# Patient Record
Sex: FEMALE | Race: WHITE | NOT HISPANIC OR LATINO | Employment: OTHER | URBAN - METROPOLITAN AREA
[De-identification: names, ages, dates, MRNs, and addresses within clinical notes are randomized per-mention and may not be internally consistent; named-entity substitution may affect disease eponyms.]

---

## 2018-08-29 ENCOUNTER — HOSPITAL ENCOUNTER (EMERGENCY)
Facility: HOSPITAL | Age: 78
Discharge: HOME | End: 2018-08-29
Admitting: PSYCHIATRY & NEUROLOGY

## 2018-08-29 ENCOUNTER — APPOINTMENT (OUTPATIENT)
Dept: GENERAL RADIOLOGY | Facility: HOSPITAL | Age: 78
End: 2018-08-29

## 2018-08-29 VITALS
BODY MASS INDEX: 13.83 KG/M2 | HEART RATE: 100 BPM | TEMPERATURE: 98.5 F | HEIGHT: 65 IN | WEIGHT: 83 LBS | DIASTOLIC BLOOD PRESSURE: 69 MMHG | SYSTOLIC BLOOD PRESSURE: 114 MMHG | OXYGEN SATURATION: 93 % | RESPIRATION RATE: 20 BRPM

## 2018-08-29 DIAGNOSIS — J44.1 COPD EXACERBATION (HCC): Primary | ICD-10-CM

## 2018-08-29 LAB
ALBUMIN SERPL-MCNC: 4.1 G/DL (ref 3.5–5.2)
ALBUMIN/GLOB SERPL: 1.3 G/DL
ALP SERPL-CCNC: 77 U/L (ref 39–117)
ALT SERPL W P-5'-P-CCNC: 32 U/L (ref 1–33)
ANION GAP SERPL CALCULATED.3IONS-SCNC: 11.4 MMOL/L
AST SERPL-CCNC: 34 U/L (ref 1–32)
BACTERIA UR QL AUTO: ABNORMAL /HPF
BASOPHILS # BLD AUTO: 0.03 10*3/MM3 (ref 0–0.2)
BASOPHILS NFR BLD AUTO: 0.4 % (ref 0–1.5)
BILIRUB SERPL-MCNC: 0.7 MG/DL (ref 0.1–1.2)
BILIRUB UR QL STRIP: NEGATIVE
BUN BLD-MCNC: 17 MG/DL (ref 8–23)
BUN/CREAT SERPL: 32.1 (ref 7–25)
CALCIUM SPEC-SCNC: 9.3 MG/DL (ref 8.6–10.5)
CHLORIDE SERPL-SCNC: 99 MMOL/L (ref 98–107)
CLARITY UR: ABNORMAL
CO2 SERPL-SCNC: 36.6 MMOL/L (ref 22–29)
COLOR UR: ABNORMAL
CREAT BLD-MCNC: 0.53 MG/DL (ref 0.57–1)
D-LACTATE SERPL-SCNC: 1.2 MMOL/L (ref 0.5–2)
DEPRECATED RDW RBC AUTO: 55.1 FL (ref 37–54)
EOSINOPHIL # BLD AUTO: 0.01 10*3/MM3 (ref 0–0.7)
EOSINOPHIL NFR BLD AUTO: 0.1 % (ref 0.3–6.2)
ERYTHROCYTE [DISTWIDTH] IN BLOOD BY AUTOMATED COUNT: 14.3 % (ref 11.7–13)
GFR SERPL CREATININE-BSD FRML MDRD: 112 ML/MIN/1.73
GLOBULIN UR ELPH-MCNC: 3.1 GM/DL
GLUCOSE BLD-MCNC: 113 MG/DL (ref 65–99)
GLUCOSE UR STRIP-MCNC: NEGATIVE MG/DL
HCT VFR BLD AUTO: 50 % (ref 35.6–45.5)
HGB BLD-MCNC: 15.6 G/DL (ref 11.9–15.5)
HGB UR QL STRIP.AUTO: ABNORMAL
HYALINE CASTS UR QL AUTO: ABNORMAL /LPF
IMM GRANULOCYTES # BLD: 0.01 10*3/MM3 (ref 0–0.03)
IMM GRANULOCYTES NFR BLD: 0.1 % (ref 0–0.5)
KETONES UR QL STRIP: ABNORMAL
LEUKOCYTE ESTERASE UR QL STRIP.AUTO: ABNORMAL
LYMPHOCYTES # BLD AUTO: 0.97 10*3/MM3 (ref 0.9–4.8)
LYMPHOCYTES NFR BLD AUTO: 13.3 % (ref 19.6–45.3)
MCH RBC QN AUTO: 32.6 PG (ref 26.9–32)
MCHC RBC AUTO-ENTMCNC: 31.2 G/DL (ref 32.4–36.3)
MCV RBC AUTO: 104.4 FL (ref 80.5–98.2)
MONOCYTES # BLD AUTO: 0.88 10*3/MM3 (ref 0.2–1.2)
MONOCYTES NFR BLD AUTO: 12 % (ref 5–12)
NEUTROPHILS # BLD AUTO: 5.42 10*3/MM3 (ref 1.9–8.1)
NEUTROPHILS NFR BLD AUTO: 74.2 % (ref 42.7–76)
NITRITE UR QL STRIP: NEGATIVE
NT-PROBNP SERPL-MCNC: 179.6 PG/ML (ref 0–1800)
PH UR STRIP.AUTO: 6.5 [PH] (ref 5–8)
PLATELET # BLD AUTO: 131 10*3/MM3 (ref 140–500)
PMV BLD AUTO: 12.8 FL (ref 6–12)
POTASSIUM BLD-SCNC: 4.5 MMOL/L (ref 3.5–5.2)
PROT SERPL-MCNC: 7.2 G/DL (ref 6–8.5)
PROT UR QL STRIP: NEGATIVE
RBC # BLD AUTO: 4.79 10*6/MM3 (ref 3.9–5.2)
RBC # UR: ABNORMAL /HPF
REF LAB TEST METHOD: ABNORMAL
SODIUM BLD-SCNC: 147 MMOL/L (ref 136–145)
SP GR UR STRIP: 1.02 (ref 1–1.03)
SQUAMOUS #/AREA URNS HPF: ABNORMAL /HPF
TROPONIN T SERPL-MCNC: <0.01 NG/ML (ref 0–0.03)
UROBILINOGEN UR QL STRIP: ABNORMAL
WBC NRBC COR # BLD: 7.31 10*3/MM3 (ref 4.5–10.7)
WBC UR QL AUTO: ABNORMAL /HPF

## 2018-08-29 PROCEDURE — 25010000002 METHYLPREDNISOLONE PER 125 MG: Performed by: NURSE PRACTITIONER

## 2018-08-29 PROCEDURE — 85025 COMPLETE CBC W/AUTO DIFF WBC: CPT | Performed by: NURSE PRACTITIONER

## 2018-08-29 PROCEDURE — 94799 UNLISTED PULMONARY SVC/PX: CPT

## 2018-08-29 PROCEDURE — 87040 BLOOD CULTURE FOR BACTERIA: CPT | Performed by: NURSE PRACTITIONER

## 2018-08-29 PROCEDURE — 96374 THER/PROPH/DIAG INJ IV PUSH: CPT

## 2018-08-29 PROCEDURE — 81001 URINALYSIS AUTO W/SCOPE: CPT | Performed by: NURSE PRACTITIONER

## 2018-08-29 PROCEDURE — 93010 ELECTROCARDIOGRAM REPORT: CPT | Performed by: INTERNAL MEDICINE

## 2018-08-29 PROCEDURE — 71046 X-RAY EXAM CHEST 2 VIEWS: CPT

## 2018-08-29 PROCEDURE — 93005 ELECTROCARDIOGRAM TRACING: CPT | Performed by: NURSE PRACTITIONER

## 2018-08-29 PROCEDURE — 80053 COMPREHEN METABOLIC PANEL: CPT | Performed by: NURSE PRACTITIONER

## 2018-08-29 PROCEDURE — 83605 ASSAY OF LACTIC ACID: CPT | Performed by: NURSE PRACTITIONER

## 2018-08-29 PROCEDURE — 99284 EMERGENCY DEPT VISIT MOD MDM: CPT

## 2018-08-29 PROCEDURE — 83880 ASSAY OF NATRIURETIC PEPTIDE: CPT | Performed by: NURSE PRACTITIONER

## 2018-08-29 PROCEDURE — 94640 AIRWAY INHALATION TREATMENT: CPT

## 2018-08-29 PROCEDURE — 84484 ASSAY OF TROPONIN QUANT: CPT | Performed by: NURSE PRACTITIONER

## 2018-08-29 RX ORDER — IPRATROPIUM BROMIDE AND ALBUTEROL SULFATE 2.5; .5 MG/3ML; MG/3ML
3 SOLUTION RESPIRATORY (INHALATION) ONCE
Status: COMPLETED | OUTPATIENT
Start: 2018-08-29 | End: 2018-08-29

## 2018-08-29 RX ORDER — ALBUTEROL SULFATE 2.5 MG/3ML
2.5 SOLUTION RESPIRATORY (INHALATION)
Status: COMPLETED | OUTPATIENT
Start: 2018-08-29 | End: 2018-08-29

## 2018-08-29 RX ORDER — LEVOFLOXACIN 500 MG/1
500 TABLET, FILM COATED ORAL DAILY
Qty: 7 TABLET | Refills: 0 | Status: SHIPPED | OUTPATIENT
Start: 2018-08-29 | End: 2018-09-03

## 2018-08-29 RX ORDER — METHYLPREDNISOLONE 4 MG/1
TABLET ORAL
Qty: 21 TABLET | Refills: 0 | Status: SHIPPED | OUTPATIENT
Start: 2018-08-29 | End: 2018-09-03

## 2018-08-29 RX ORDER — METHYLPREDNISOLONE SODIUM SUCCINATE 125 MG/2ML
125 INJECTION, POWDER, LYOPHILIZED, FOR SOLUTION INTRAMUSCULAR; INTRAVENOUS ONCE
Status: COMPLETED | OUTPATIENT
Start: 2018-08-29 | End: 2018-08-29

## 2018-08-29 RX ADMIN — IPRATROPIUM BROMIDE AND ALBUTEROL SULFATE 3 ML: .5; 3 SOLUTION RESPIRATORY (INHALATION) at 09:34

## 2018-08-29 RX ADMIN — METHYLPREDNISOLONE SODIUM SUCCINATE 125 MG: 125 INJECTION, POWDER, FOR SOLUTION INTRAMUSCULAR; INTRAVENOUS at 10:22

## 2018-08-29 RX ADMIN — ALBUTEROL SULFATE 2.5 MG: 2.5 SOLUTION RESPIRATORY (INHALATION) at 11:06

## 2018-08-29 RX ADMIN — ALBUTEROL SULFATE 2.5 MG: 2.5 SOLUTION RESPIRATORY (INHALATION) at 09:49

## 2018-09-03 ENCOUNTER — HOSPITAL ENCOUNTER (INPATIENT)
Facility: HOSPITAL | Age: 78
LOS: 3 days | Discharge: HOME HEALTH | End: 2018-09-06
Attending: EMERGENCY MEDICINE | Admitting: HOSPITALIST

## 2018-09-03 ENCOUNTER — APPOINTMENT (OUTPATIENT)
Dept: GENERAL RADIOLOGY | Facility: HOSPITAL | Age: 78
End: 2018-09-03

## 2018-09-03 DIAGNOSIS — Z74.09 IMPAIRED FUNCTIONAL MOBILITY, BALANCE, GAIT, AND ENDURANCE: ICD-10-CM

## 2018-09-03 DIAGNOSIS — J44.1 COPD EXACERBATION (HCC): Primary | ICD-10-CM

## 2018-09-03 PROBLEM — J96.22 ACUTE ON CHRONIC RESPIRATORY FAILURE WITH HYPERCAPNIA (HCC): Status: ACTIVE | Noted: 2018-09-03

## 2018-09-03 PROBLEM — J43.9 EMPHYSEMA OF LUNG (HCC): Status: ACTIVE | Noted: 2018-09-03

## 2018-09-03 PROBLEM — E86.0 DEHYDRATION: Status: ACTIVE | Noted: 2018-09-03

## 2018-09-03 PROBLEM — E46 PROTEIN-CALORIE MALNUTRITION (HCC): Status: ACTIVE | Noted: 2018-09-03

## 2018-09-03 LAB
ALBUMIN SERPL-MCNC: 3.6 G/DL (ref 3.5–5.2)
ALBUMIN/GLOB SERPL: 1.3 G/DL
ALP SERPL-CCNC: 61 U/L (ref 39–117)
ALT SERPL W P-5'-P-CCNC: 24 U/L (ref 1–33)
ANION GAP SERPL CALCULATED.3IONS-SCNC: 14.7 MMOL/L
ARTERIAL PATENCY WRIST A: NORMAL
ARTERIAL PATENCY WRIST A: POSITIVE
AST SERPL-CCNC: 34 U/L (ref 1–32)
ATMOSPHERIC PRESS: 754.5 MMHG
ATMOSPHERIC PRESS: NORMAL MMHG
BACTERIA SPEC AEROBE CULT: NORMAL
BACTERIA SPEC AEROBE CULT: NORMAL
BACTERIA UR QL AUTO: ABNORMAL /HPF
BASE EXCESS BLDA CALC-SCNC: 7.8 MMOL/L (ref 0–2)
BASE EXCESS BLDA CALC-SCNC: NORMAL MMOL/L (ref 0–2)
BASOPHILS # BLD AUTO: 0.02 10*3/MM3 (ref 0–0.2)
BASOPHILS NFR BLD AUTO: 0.2 % (ref 0–1.5)
BDY SITE: ABNORMAL
BDY SITE: NORMAL
BILIRUB SERPL-MCNC: 0.6 MG/DL (ref 0.1–1.2)
BILIRUB UR QL STRIP: NEGATIVE
BUN BLD-MCNC: 16 MG/DL (ref 8–23)
BUN/CREAT SERPL: 26.2 (ref 7–25)
CALCIUM SPEC-SCNC: 8.6 MG/DL (ref 8.6–10.5)
CHLORIDE SERPL-SCNC: 99 MMOL/L (ref 98–107)
CLARITY UR: CLEAR
CO2 SERPL-SCNC: 27.3 MMOL/L (ref 22–29)
COLOR UR: YELLOW
CREAT BLD-MCNC: 0.61 MG/DL (ref 0.57–1)
DEPRECATED RDW RBC AUTO: 53.8 FL (ref 37–54)
EOSINOPHIL # BLD AUTO: 0.02 10*3/MM3 (ref 0–0.7)
EOSINOPHIL NFR BLD AUTO: 0.2 % (ref 0.3–6.2)
ERYTHROCYTE [DISTWIDTH] IN BLOOD BY AUTOMATED COUNT: 13.9 % (ref 11.7–13)
GAS FLOW AIRWAY: 2 LPM
GAS FLOW AIRWAY: NORMAL LPM
GFR SERPL CREATININE-BSD FRML MDRD: 95 ML/MIN/1.73
GLOBULIN UR ELPH-MCNC: 2.7 GM/DL
GLUCOSE BLD-MCNC: 139 MG/DL (ref 65–99)
GLUCOSE BLDC GLUCOMTR-MCNC: 134 MG/DL (ref 70–130)
GLUCOSE UR STRIP-MCNC: NEGATIVE MG/DL
HBA1C MFR BLD: 6.2 % (ref 4.8–5.6)
HCO3 BLDA-SCNC: 36.3 MMOL/L (ref 22–28)
HCO3 BLDA-SCNC: NORMAL MMOL/L (ref 22–28)
HCT VFR BLD AUTO: 54 % (ref 35.6–45.5)
HGB BLD-MCNC: 16.3 G/DL (ref 11.9–15.5)
HGB UR QL STRIP.AUTO: ABNORMAL
HOLD SPECIMEN: NORMAL
HYALINE CASTS UR QL AUTO: ABNORMAL /LPF
IMM GRANULOCYTES # BLD: 0.02 10*3/MM3 (ref 0–0.03)
IMM GRANULOCYTES NFR BLD: 0.2 % (ref 0–0.5)
KETONES UR QL STRIP: NEGATIVE
LEUKOCYTE ESTERASE UR QL STRIP.AUTO: ABNORMAL
LYMPHOCYTES # BLD AUTO: 1.23 10*3/MM3 (ref 0.9–4.8)
LYMPHOCYTES NFR BLD AUTO: 14.3 % (ref 19.6–45.3)
MAGNESIUM SERPL-MCNC: 2.3 MG/DL (ref 1.6–2.4)
MCH RBC QN AUTO: 31.9 PG (ref 26.9–32)
MCHC RBC AUTO-ENTMCNC: 30.2 G/DL (ref 32.4–36.3)
MCV RBC AUTO: 105.7 FL (ref 80.5–98.2)
MODALITY: ABNORMAL
MODALITY: NORMAL
MONOCYTES # BLD AUTO: 0.79 10*3/MM3 (ref 0.2–1.2)
MONOCYTES NFR BLD AUTO: 9.2 % (ref 5–12)
NEUTROPHILS # BLD AUTO: 6.57 10*3/MM3 (ref 1.9–8.1)
NEUTROPHILS NFR BLD AUTO: 76.1 % (ref 42.7–76)
NITRITE UR QL STRIP: NEGATIVE
NRBC BLD MANUAL-RTO: 0 /100 WBC (ref 0–0)
NT-PROBNP SERPL-MCNC: 245 PG/ML (ref 0–1800)
PCO2 BLDA: 64.2 MM HG (ref 35–45)
PCO2 BLDA: NORMAL MM HG (ref 35–45)
PH BLDA: 7.36 PH UNITS (ref 7.35–7.45)
PH BLDA: NORMAL PH UNITS (ref 7.35–7.45)
PH UR STRIP.AUTO: 7 [PH] (ref 5–8)
PLAT MORPH BLD: NORMAL
PLATELET # BLD AUTO: 119 10*3/MM3 (ref 140–500)
PMV BLD AUTO: 12 FL (ref 6–12)
PO2 BLDA: 52.9 MM HG (ref 80–100)
PO2 BLDA: NORMAL MM HG (ref 80–100)
POTASSIUM BLD-SCNC: 5 MMOL/L (ref 3.5–5.2)
PROT SERPL-MCNC: 6.3 G/DL (ref 6–8.5)
PROT UR QL STRIP: NEGATIVE
RBC # BLD AUTO: 5.11 10*6/MM3 (ref 3.9–5.2)
RBC # UR: ABNORMAL /HPF
RBC MORPH BLD: NORMAL
REF LAB TEST METHOD: ABNORMAL
SAO2 % BLDCOA: 84.2 % (ref 92–99)
SAO2 % BLDCOA: NORMAL % (ref 92–99)
SET MECH RESP RATE: 24
SET MECH RESP RATE: NORMAL
SODIUM BLD-SCNC: 141 MMOL/L (ref 136–145)
SP GR UR STRIP: 1.02 (ref 1–1.03)
SQUAMOUS #/AREA URNS HPF: ABNORMAL /HPF
TOTAL RATE: NORMAL BREATHS/MINUTE
TROPONIN T SERPL-MCNC: <0.01 NG/ML (ref 0–0.03)
UROBILINOGEN UR QL STRIP: ABNORMAL
WBC MORPH BLD: NORMAL
WBC NRBC COR # BLD: 8.63 10*3/MM3 (ref 4.5–10.7)
WBC UR QL AUTO: ABNORMAL /HPF
WHOLE BLOOD HOLD SPECIMEN: NORMAL

## 2018-09-03 PROCEDURE — 36600 WITHDRAWAL OF ARTERIAL BLOOD: CPT

## 2018-09-03 PROCEDURE — 99285 EMERGENCY DEPT VISIT HI MDM: CPT

## 2018-09-03 PROCEDURE — 94799 UNLISTED PULMONARY SVC/PX: CPT

## 2018-09-03 PROCEDURE — 36415 COLL VENOUS BLD VENIPUNCTURE: CPT

## 2018-09-03 PROCEDURE — 25010000002 METHYLPREDNISOLONE PER 125 MG: Performed by: EMERGENCY MEDICINE

## 2018-09-03 PROCEDURE — 87581 M.PNEUMON DNA AMP PROBE: CPT | Performed by: HOSPITALIST

## 2018-09-03 PROCEDURE — 71046 X-RAY EXAM CHEST 2 VIEWS: CPT

## 2018-09-03 PROCEDURE — 83880 ASSAY OF NATRIURETIC PEPTIDE: CPT | Performed by: EMERGENCY MEDICINE

## 2018-09-03 PROCEDURE — 81001 URINALYSIS AUTO W/SCOPE: CPT | Performed by: EMERGENCY MEDICINE

## 2018-09-03 PROCEDURE — 85025 COMPLETE CBC W/AUTO DIFF WBC: CPT | Performed by: EMERGENCY MEDICINE

## 2018-09-03 PROCEDURE — 84484 ASSAY OF TROPONIN QUANT: CPT | Performed by: EMERGENCY MEDICINE

## 2018-09-03 PROCEDURE — 93005 ELECTROCARDIOGRAM TRACING: CPT | Performed by: EMERGENCY MEDICINE

## 2018-09-03 PROCEDURE — 87486 CHLMYD PNEUM DNA AMP PROBE: CPT | Performed by: HOSPITALIST

## 2018-09-03 PROCEDURE — 83036 HEMOGLOBIN GLYCOSYLATED A1C: CPT | Performed by: HOSPITALIST

## 2018-09-03 PROCEDURE — 94640 AIRWAY INHALATION TREATMENT: CPT

## 2018-09-03 PROCEDURE — 85007 BL SMEAR W/DIFF WBC COUNT: CPT | Performed by: EMERGENCY MEDICINE

## 2018-09-03 PROCEDURE — 25010000002 ENOXAPARIN PER 10 MG: Performed by: HOSPITALIST

## 2018-09-03 PROCEDURE — 82803 BLOOD GASES ANY COMBINATION: CPT

## 2018-09-03 PROCEDURE — 80053 COMPREHEN METABOLIC PANEL: CPT | Performed by: EMERGENCY MEDICINE

## 2018-09-03 PROCEDURE — 93010 ELECTROCARDIOGRAM REPORT: CPT | Performed by: INTERNAL MEDICINE

## 2018-09-03 PROCEDURE — 87633 RESP VIRUS 12-25 TARGETS: CPT | Performed by: HOSPITALIST

## 2018-09-03 PROCEDURE — 25010000002 METHYLPREDNISOLONE PER 40 MG: Performed by: HOSPITALIST

## 2018-09-03 PROCEDURE — 87798 DETECT AGENT NOS DNA AMP: CPT | Performed by: HOSPITALIST

## 2018-09-03 PROCEDURE — 82962 GLUCOSE BLOOD TEST: CPT

## 2018-09-03 PROCEDURE — 83735 ASSAY OF MAGNESIUM: CPT | Performed by: EMERGENCY MEDICINE

## 2018-09-03 RX ORDER — ONDANSETRON 2 MG/ML
4 INJECTION INTRAMUSCULAR; INTRAVENOUS EVERY 6 HOURS PRN
Status: DISCONTINUED | OUTPATIENT
Start: 2018-09-03 | End: 2018-09-06 | Stop reason: HOSPADM

## 2018-09-03 RX ORDER — TRAMADOL HYDROCHLORIDE 50 MG/1
50 TABLET ORAL EVERY 6 HOURS PRN
COMMUNITY

## 2018-09-03 RX ORDER — METHYLPREDNISOLONE SODIUM SUCCINATE 125 MG/2ML
125 INJECTION, POWDER, LYOPHILIZED, FOR SOLUTION INTRAMUSCULAR; INTRAVENOUS ONCE
Status: COMPLETED | OUTPATIENT
Start: 2018-09-03 | End: 2018-09-03

## 2018-09-03 RX ORDER — SODIUM CHLORIDE 9 MG/ML
75 INJECTION, SOLUTION INTRAVENOUS CONTINUOUS
Status: DISCONTINUED | OUTPATIENT
Start: 2018-09-03 | End: 2018-09-04

## 2018-09-03 RX ORDER — ALBUTEROL SULFATE 2.5 MG/3ML
2.5 SOLUTION RESPIRATORY (INHALATION)
Status: COMPLETED | OUTPATIENT
Start: 2018-09-03 | End: 2018-09-03

## 2018-09-03 RX ORDER — MULTIPLE VITAMINS W/ MINERALS TAB 9MG-400MCG
1 TAB ORAL DAILY
Status: DISCONTINUED | OUTPATIENT
Start: 2018-09-03 | End: 2018-09-03

## 2018-09-03 RX ORDER — IPRATROPIUM BROMIDE AND ALBUTEROL SULFATE 2.5; .5 MG/3ML; MG/3ML
3 SOLUTION RESPIRATORY (INHALATION) ONCE
Status: COMPLETED | OUTPATIENT
Start: 2018-09-03 | End: 2018-09-03

## 2018-09-03 RX ORDER — SODIUM CHLORIDE 0.9 % (FLUSH) 0.9 %
1-10 SYRINGE (ML) INJECTION AS NEEDED
Status: DISCONTINUED | OUTPATIENT
Start: 2018-09-03 | End: 2018-09-06 | Stop reason: HOSPADM

## 2018-09-03 RX ORDER — GUAIFENESIN 600 MG/1
600 TABLET, EXTENDED RELEASE ORAL EVERY 12 HOURS
Status: DISCONTINUED | OUTPATIENT
Start: 2018-09-03 | End: 2018-09-06 | Stop reason: HOSPADM

## 2018-09-03 RX ORDER — METHYLPREDNISOLONE SODIUM SUCCINATE 40 MG/ML
30 INJECTION, POWDER, LYOPHILIZED, FOR SOLUTION INTRAMUSCULAR; INTRAVENOUS EVERY 12 HOURS
Status: DISCONTINUED | OUTPATIENT
Start: 2018-09-03 | End: 2018-09-05

## 2018-09-03 RX ORDER — ACETAMINOPHEN 325 MG/1
650 TABLET ORAL EVERY 4 HOURS PRN
Status: DISCONTINUED | OUTPATIENT
Start: 2018-09-03 | End: 2018-09-06 | Stop reason: HOSPADM

## 2018-09-03 RX ORDER — TRAMADOL HYDROCHLORIDE 50 MG/1
50 TABLET ORAL EVERY 6 HOURS PRN
Status: DISCONTINUED | OUTPATIENT
Start: 2018-09-03 | End: 2018-09-06 | Stop reason: HOSPADM

## 2018-09-03 RX ORDER — ACETAMINOPHEN 500 MG
1000 TABLET ORAL ONCE
Status: COMPLETED | OUTPATIENT
Start: 2018-09-03 | End: 2018-09-03

## 2018-09-03 RX ORDER — IPRATROPIUM BROMIDE AND ALBUTEROL SULFATE 2.5; .5 MG/3ML; MG/3ML
3 SOLUTION RESPIRATORY (INHALATION)
Status: DISCONTINUED | OUTPATIENT
Start: 2018-09-03 | End: 2018-09-06 | Stop reason: HOSPADM

## 2018-09-03 RX ORDER — SODIUM CHLORIDE 0.9 % (FLUSH) 0.9 %
10 SYRINGE (ML) INJECTION AS NEEDED
Status: DISCONTINUED | OUTPATIENT
Start: 2018-09-03 | End: 2018-09-06 | Stop reason: HOSPADM

## 2018-09-03 RX ADMIN — SODIUM CHLORIDE 500 ML: 9 INJECTION, SOLUTION INTRAVENOUS at 14:56

## 2018-09-03 RX ADMIN — ALBUTEROL SULFATE 2.5 MG: 2.5 SOLUTION RESPIRATORY (INHALATION) at 14:37

## 2018-09-03 RX ADMIN — SODIUM CHLORIDE 250 ML: 9 INJECTION, SOLUTION INTRAVENOUS at 13:53

## 2018-09-03 RX ADMIN — IPRATROPIUM BROMIDE AND ALBUTEROL SULFATE 3 ML: .5; 3 SOLUTION RESPIRATORY (INHALATION) at 20:20

## 2018-09-03 RX ADMIN — IPRATROPIUM BROMIDE AND ALBUTEROL SULFATE 3 ML: .5; 3 SOLUTION RESPIRATORY (INHALATION) at 13:44

## 2018-09-03 RX ADMIN — GUAIFENESIN 600 MG: 600 TABLET, EXTENDED RELEASE ORAL at 20:44

## 2018-09-03 RX ADMIN — METHYLPREDNISOLONE SODIUM SUCCINATE 125 MG: 125 INJECTION, POWDER, FOR SOLUTION INTRAMUSCULAR; INTRAVENOUS at 13:53

## 2018-09-03 RX ADMIN — METHYLPREDNISOLONE SODIUM SUCCINATE 30 MG: 40 INJECTION, POWDER, LYOPHILIZED, FOR SOLUTION INTRAMUSCULAR; INTRAVENOUS at 18:48

## 2018-09-03 RX ADMIN — ACETAMINOPHEN 1000 MG: 500 TABLET, FILM COATED ORAL at 13:56

## 2018-09-03 RX ADMIN — ALBUTEROL SULFATE 2.5 MG: 2.5 SOLUTION RESPIRATORY (INHALATION) at 14:15

## 2018-09-03 RX ADMIN — ENOXAPARIN SODIUM 40 MG: 40 INJECTION SUBCUTANEOUS at 18:48

## 2018-09-03 RX ADMIN — TRAMADOL HYDROCHLORIDE 50 MG: 50 TABLET, FILM COATED ORAL at 20:44

## 2018-09-03 RX ADMIN — SODIUM CHLORIDE 75 ML/HR: 9 INJECTION, SOLUTION INTRAVENOUS at 18:52

## 2018-09-03 NOTE — PLAN OF CARE
Problem: Breathing Pattern Ineffective (Adult)  Intervention: Optimize Oxygenation/Ventilation/Perfusion   09/03/18 1824   Respiratory Interventions   Airway/Ventilation Management calming measures promoted   Breathing Techniques/Airway Clearance deep/controlled cough encouraged   Positioning   Head of Bed (HOB) HOB at 90 degrees       Goal: Effective Oxygenation/Ventilation  Outcome: Ongoing (interventions implemented as appropriate)   09/03/18 1824   Breathing Pattern Ineffective (Adult)   Effective Oxygenation/Ventilation making progress toward outcome

## 2018-09-03 NOTE — PLAN OF CARE
Problem: Fall Risk (Adult)  Intervention: Monitor/Assist with Self Care   09/03/18 4142   Activity   Activity Assistance Provided assistance, 1 person   Daily Care Interventions   Self-Care Promotion BADL personal objects within reach

## 2018-09-03 NOTE — PLAN OF CARE
Problem: Patient Care Overview  Goal: Plan of Care Review  Outcome: Ongoing (interventions implemented as appropriate)   09/03/18 0223   Coping/Psychosocial   Plan of Care Reviewed With patient   Plan of Care Review   Progress no change   OTHER   Outcome Summary Alert and oriented x4, no falls, 3L per n/c, no SOA at this time. Assist x2 to BSC. will CTM

## 2018-09-03 NOTE — ED PROVIDER NOTES
EMERGENCY DEPARTMENT ENCOUNTER    CHIEF COMPLAINT  Chief Complaint: Generalized weakness  History given by: Pt  History limited by: Nothing  Room Number: 21/21  PMD: Dennise Chu MD      HPI:  Pt is a 77 y.o. female who presents complaining of not feeling improved after being in the hospital 5 days ago with bronchitis. Since being discharged, the pt has increased weakness (family states the pt cannot stand even with assist of walker and usually ambulates independently with a walker) and productive cough. The pt denies current fever, abd pain, dysuria, black or bloody stool, or leg swelling, and confirms chest pain with cough. The pt normally walks with a walker but has not been able to due to weakness.   The pt is on 2-3L of O2 at home. The pt finished her medrol dose pack and levaquin yesterday.     Duration:  5 days  Onset: Gradual  Timing: Constant  Location: General  Quality: Weak  Intensity/Severity: Moderate  Progression: Worsening  Associated Symptoms: Productive cough, chest pain with cough  Aggravating Factors: Unknown  Alleviating Factors: None  Previous Episodes: Unknown  Treatment before arrival: The pt finished medrol dose pack yesterday and does not feel improved.     PAST MEDICAL HISTORY  Active Ambulatory Problems     Diagnosis Date Noted   • Acute respiratory failure with hypoxia (CMS/[facility]) 12/01/2016   • Chronic respiratory failure with hypercapnia (CMS/[facility]) 12/01/2016   • Hyperglycemia 12/01/2016   • Chronic back pain 12/01/2016   • Malnutrition of moderate degree ([place]: 60% to less than 75% of standard weight) (CMS/[facility]) 12/03/2016     Resolved Ambulatory Problems     Diagnosis Date Noted   • Chronic obstructive pulmonary disease with acute exacerbation (CMS/[facility]) 12/01/2016     Past Medical History:   Diagnosis Date   • Back pain    • COPD (chronic obstructive pulmonary disease) (CMS/[facility])    • Emphysema of lung (CMS/[facility])    • Pneumonia        PAST SURGICAL HISTORY  Past Surgical History:    Procedure Laterality Date   • ABDOMINAL SURGERY     • HYSTERECTOMY         FAMILY HISTORY  History reviewed. No pertinent family history.    SOCIAL HISTORY  Social History     Social History   • Marital status:      Spouse name: N/A   • Number of children: N/A   • Years of education: N/A     Occupational History   • Not on file.     Social History Main Topics   • Smoking status: Current Every Day Smoker     Packs/day: 1.00     Types: Cigarettes   • Smokeless tobacco: Not on file   • Alcohol use No   • Drug use: No   • Sexual activity: Defer     Other Topics Concern   • Not on file     Social History Narrative   • No narrative on file       ALLERGIES  Erythromycin; Aspirin; Codeine; Iodine; Latex; Morphine; Penicillins; and Sulfa antibiotics    REVIEW OF SYSTEMS  Review of Systems   Constitutional: Negative for chills and fever.   HENT: Negative for rhinorrhea and sore throat.    Eyes: Negative for visual disturbance.   Respiratory: Positive for cough. Negative for shortness of breath.    Cardiovascular: Positive for chest pain (With cough). Negative for palpitations and leg swelling.   Gastrointestinal: Negative for abdominal pain, diarrhea and vomiting.   Endocrine: Negative.    Genitourinary: Negative for decreased urine volume, dysuria and frequency.   Musculoskeletal: Negative for neck pain.   Skin: Negative for rash.   Neurological: Positive for weakness. Negative for syncope and headaches.   Psychiatric/Behavioral: Negative.    All other systems reviewed and are negative.      PHYSICAL EXAM  ED Triage Vitals   Temp Heart Rate Resp BP SpO2   09/03/18 1135 09/03/18 1135 09/03/18 1135 09/03/18 1144 09/03/18 1135   98.5 °F (36.9 °C) 90 20 129/78 93 %      Temp src Heart Rate Source Patient Position BP Location FiO2 (%)   -- -- 09/03/18 1144 09/03/18 1144 --     Sitting Right arm        Physical Exam   Constitutional: She is oriented to person, place, and time.  Non-toxic appearance. She appears distressed  (mild).   HENT:   Head: Normocephalic and atraumatic.   Eyes: EOM are normal.   Neck: Normal range of motion. Neck supple.   Cardiovascular: Normal rate, regular rhythm, normal heart sounds and intact distal pulses.    No murmur heard.  Pulses:       Posterior tibial pulses are 2+ on the right side, and 2+ on the left side.   Pulmonary/Chest: Effort normal. No respiratory distress. She has decreased breath sounds. She has wheezes.   Abdominal: Soft. Bowel sounds are normal. There is no tenderness. There is no rebound and no guarding.   Musculoskeletal: Normal range of motion. She exhibits no edema.   Neurological: She is alert and oriented to person, place, and time.   Skin: Skin is warm and dry.   Psychiatric: Affect normal.   Nursing note and vitals reviewed.      LAB RESULTS  Lab Results (last 24 hours)     Procedure Component Value Units Date/Time    CBC & Differential [[id number]] Collected:  09/03/18 1149    Specimen:  Blood Updated:  09/03/18 1226    Narrative:       The following orders were created for panel order CBC & Differential.  Procedure                               Abnormality         Status                     ---------                               -----------         ------                     Scan Slide[[id number]]                   Normal              Final result               CBC Auto Differential[[id number]]        Abnormal            Final result                 Please view results for these tests on the individual orders.    Comprehensive Metabolic Panel [[id number]]  (Abnormal) Collected:  09/03/18 1149    Specimen:  Blood Updated:  09/03/18 1256     Glucose 139 (H) mg/dL      BUN 16 mg/dL      Creatinine 0.61 mg/dL      Sodium 141 mmol/L      Potassium 5.0 mmol/L      Chloride 99 mmol/L      CO2 27.3 mmol/L      Calcium 8.6 mg/dL      Total Protein 6.3 g/dL      Albumin 3.60 g/dL      ALT (SGPT) 24 U/L      AST (SGOT) 34 (H) U/L      Comment: Specimen hemolyzed.  Results may be affected.         Alkaline Phosphatase 61 U/L      Total Bilirubin 0.6 mg/dL      eGFR Non African Amer 95 mL/min/1.73      Globulin 2.7 gm/dL      A/G Ratio 1.3 g/dL      BUN/Creatinine Ratio 26.2 (H)     Anion Gap 14.7 mmol/L     Narrative:       The MDRD GFR formula is only valid for adults with stable renal function between ages 18 and 70.    Troponin [[id number]]  (Normal) Collected:  09/03/18 1149    Specimen:  Blood Updated:  09/03/18 1239     Troponin T <0.010 ng/mL     Narrative:       Troponin T Reference Ranges:  Less than 0.03 ng/mL:    Negative for AMI  0.03 to 0.09 ng/mL:      Indeterminant for AMI  Greater than 0.09 ng/mL: Positive for AMI    Magnesium [[id number]]  (Normal) Collected:  09/03/18 1149    Specimen:  Blood Updated:  09/03/18 1239     Magnesium 2.3 mg/dL     CBC Auto Differential [[id number]]  (Abnormal) Collected:  09/03/18 1149    Specimen:  Blood Updated:  09/03/18 1226     WBC 8.63 10*3/mm3      RBC 5.11 10*6/mm3      Hemoglobin 16.3 (H) g/dL      Hematocrit 54.0 (H) %      .7 (H) fL      MCH 31.9 pg      MCHC 30.2 (L) g/dL      RDW 13.9 (H) %      RDW-SD 53.8 fl      MPV 12.0 fL      Platelets 119 (L) 10*3/mm3      Neutrophil % 76.1 (H) %      Lymphocyte % 14.3 (L) %      Monocyte % 9.2 %      Eosinophil % 0.2 (L) %      Basophil % 0.2 %      Immature Grans % 0.2 %      Neutrophils, Absolute 6.57 10*3/mm3      Lymphocytes, Absolute 1.23 10*3/mm3      Monocytes, Absolute 0.79 10*3/mm3      Eosinophils, Absolute 0.02 10*3/mm3      Basophils, Absolute 0.02 10*3/mm3      Immature Grans, Absolute 0.02 10*3/mm3      nRBC 0.0 /100 WBC     Scan Slide [[id number]]  (Normal) Collected:  09/03/18 1149    Specimen:  Blood Updated:  09/03/18 1226     RBC Morphology Normal     WBC Morphology Normal     Platelet Morphology Normal    POC Glucose Once [[id number]]  (Abnormal) Collected:  09/03/18 1151    Specimen:  Blood Updated:  09/03/18 1153     Glucose 134 (H) mg/dL     Narrative:       Meter: [id number]  [facility] [id number] [facility]    Urinalysis With Microscopic If Indicated (No Culture) - Urine, Catheter [[id number]]  (Abnormal) Collected:  09/03/18 1200    Specimen:  Urine from Urine, Catheter Updated:  09/03/18 1224     Color, UA Yellow     Appearance, UA Clear     pH, UA 7.0     Specific Gravity, UA 1.018     Glucose, UA Negative     Ketones, UA Negative     Bilirubin, UA Negative     Blood, UA Small (1+) (A)     Protein, UA Negative     Leuk Esterase, UA Trace (A)     Nitrite, UA Negative     Urobilinogen, UA 1.0 E.U./dL    Urinalysis, Microscopic Only - Urine, Clean Catch [[id number]]  (Abnormal) Collected:  09/03/18 1200    Specimen:  Urine from Urine, Catheter Updated:  09/03/18 1225     RBC, UA 6-12 (A) /HPF      WBC, UA 0-2 /HPF      Bacteria, UA None Seen /HPF      Squamous Epithelial Cells, UA 0-2 /HPF      Hyaline Casts, UA 3-6 /LPF      Methodology Automated Microscopy    BNP [[id number]]  (Normal) Collected:  09/03/18 1301    Specimen:  Blood Updated:  09/03/18 1333     proBNP 245.0 pg/mL     Narrative:       Among patients with dyspnea, NT-proBNP is highly sensitive for the detection of acute congestive heart failure. In addition NT-proBNP of <300 pg/ml effectively rules out acute congestive heart failure with 99% negative predictive value.          I ordered the above labs and reviewed the results    RADIOLOGY  XR Chest 2 View      FINDINGS: There is severe COPD with hyperinflation and some vague  scattered fibrotic scarring. There is prominent elevation of the left  hemidiaphragm with some adjacent scarring and this is unchanged from the  recent study of 08/29/2018 as well as an earlier chest x-ray dated  12/08/2016. The heart size remains normal.        I ordered the above noted radiological studies. Interpreted by radiologist. Reviewed by me in PACS.       PROCEDURES  Procedures  EKG           EKG time: 1503  Rhythm/Rate: Sinus, 90s  P waves and NC: P normal, short ENRIKE interval  QRS,  axis: Poor R wave progression   ST and T waves: Unremarkable     Interpreted Contemporaneously by me, independently viewed  Unchanged compared to prior 8/29/18      PROGRESS AND CONSULTS     1147 Ordered POC glucose, CMP, UA, BNP, troponin, magnesium, CBC, and CXR for further evaluation.     1339 Ordered slu-medrol, albuterol, and duo-neb for breathing.     1352 Ordered Tylenol.     1454 Ordered 500mL fluids. Placed call to [facility] for admission.     1516 Discussed the pt with Dr. Jimenez ([facility]) who agrees to admit the pt.    1520 Ordered inpatient admission and med/surg bed request.    1634 Discussed that the pt is being admitted. The pt agrees with the plan ad all questions were addressed.    MEDICAL DECISION MAKING  Results were reviewed/discussed with the patient and they were also made aware of online access. Pt also made aware that some labs, such as cultures, will not be resulted during ER visit and follow up with PMD is necessary.     MDM  Number of Diagnoses or Management Options  COPD exacerbation (CMS/HCC):      Amount and/or Complexity of Data Reviewed  Clinical lab tests: ordered and reviewed (Hemoglobin: 16.3, troponin negative)  Tests in the radiology section of CPT®: ordered and reviewed (CXR: severe COPD with hyperinflation)  Tests in the medicine section of CPT®: ordered and reviewed (See EKG)  Decide to obtain previous medical records or to obtain history from someone other than the patient: yes  Review and summarize past medical records: yes  Discuss the patient with other providers: yes (Dr. Jimenez ([facility]))    Patient Progress  Patient progress: stable         DIAGNOSIS  Final diagnoses:   COPD exacerbation (CMS/HCC)       DISPOSITION  ADMISSION    Discussed treatment plan and reason for admission with pt/family and admitting physician.  Pt/family voiced understanding of the plan for admission for further testing/treatment as needed.       Latest Documented Vital Signs:  As of 3:39 PM  BP-  122/66 HR- 92 Temp- 98.5 °F (36.9 °C) O2 sat- 91%    --  Documentation assistance provided by hugo Lambert for Dr. Davila.  Information recorded by the scribsivan was done at my direction and has been verified and validated by me.         Juliane Lambert  09/03/18 9811       Maribell Davila MD  09/05/18 0059

## 2018-09-03 NOTE — H&P
"                  [facility]    Patient Identification:  Name: Latisha Amaya  Age/Sex: 77 y.o. female  :  1940  MRN: [id number]         Primary Care Physician: Dennise Chu MD    Chief Complaint   Patient presents with   • Weakness - Generalized     here last week for COPD exacerbation, completed steroids and levaquin and \"just getting weaker\"         History of Present Illness  The patient is a 77 y.o. female who presents with increasing shortness of breath, cough, wheezing and fatigue.  Seen in the emergency room on 18 and given a dose of Solu-Medrol, DuoNeb, oxygen at 2 L and discharged on a Medrol Dosepak and Levaquin.  When seen in  she was discharged on Symbicort and Spiriva with plans for her to follow up with [facility].  She did not continue the meds noted she see the pulmonologist.  She continues to smoke and uses oxygen intermittently but not at the same time.  She denies any history of angina, coronary artery disease, palpitations, valvular disease or arrhythmias.    Past Medical History:   Diagnosis Date   • Back pain    • COPD (chronic obstructive pulmonary disease) (CMS/HCC)    • Emphysema of lung (CMS/[facility])    • Pneumonia     3 times since 2016     Past Surgical History:   Procedure Laterality Date   • ABDOMINAL SURGERY     • HYSTERECTOMY       History reviewed. No pertinent family history.  Social History   Substance Use Topics   • Smoking status: Current Every Day Smoker     Packs/day: 1.00     Types: Cigarettes   • Smokeless tobacco: Not on file   • Alcohol use No     Prescriptions Prior to Admission   Medication Sig Dispense Refill Last Dose   • Multiple Vitamins-Minerals (MULTIVITAMIN ADULT PO) Take 1 tablet by mouth Daily.   2018 at Unknown time   • traMADol (ULTRAM) 50 MG tablet Take 50 mg by mouth Every 6 (Six) Hours As Needed for Moderate Pain .        Allergies:  Erythromycin; Aspirin; Codeine; Iodine; Latex; " Morphine; Penicillins; and Sulfa antibiotics    Review Of Systems:  CONSTITUTIONAL: Denies fever or weight loss or gain from her usual level  H EENT: Denies visual or auditory changes  ENDOCRINE: Denies symptoms suggestive of thyroid disease or diabetes  BREAST: Denies breast issues  GI: Denies dysphasia, change in bowel pattern, abdominal pain. Denies GERD, irritable bowel disease, inflammatory bowel disease, gallbladder disease, hepatitis   : Denies dysuria, frequency, urgency, hematuria.  Denies recurrent UTIs, nephrolithiasis, incontinence  MUSCULOSKELETAL: Denies CTD, gout, osteoporosis  NEURO: Denies paresthesias, muscle weakness, ataxia.  Denies seizures, TIA, CVA, chronic headaches  PSYCH: Denies depression or anxiety   HEM/ONC: Denies anemia, bleeding or bone marrow disease, or cancer  The rest of the review of systems are listed in the history of present illness.          Vital Signs  Temp:  [98 °F (36.7 °C)-98.5 °F (36.9 °C)] 98 °F (36.7 °C)  Heart Rate:  [] 98  Resp:  [16-24] 18  BP: ()/(55-86) 110/59  Body mass index is 15.82 kg/m².    GENERAL: The patient is a  white female who has significant protein wasting, is disheveled and smells of tobacco  HEENT: PERRLA, pupils are 4 mm in size, EOMI, oral mucosa and pharynx is normal  NECK: Thyroid is not enlarged, no JVD, no carotid bruits, sternocleidomastoid hypertrophy  CHEST: There is an increase in AP diameter with supracostal and intercostal retractions  LUNGS: Minimal air movement in any location.  No wheezing on forced expiration no rhonchi  HEART: Regular rate and rhythm, no murmur, gallops, or extrasystoles, PMI is normal  BREAST: Not performed  ABDOMEN: Soft, not distended, and non tender with normal bowel sounds.  No masses or organomegaly, abdominal aorta is palpable without bruits  EXTREMITIES: Significant dry skin over feet and legs with onychomycosis, good range of motion of  elbows, hips, knees  NEUROLOGIC: She is alert and   oriented ×3, muscle strength in upper and lower extremities is equal in all areas,  RECTAL & PERINEUM: Not performed  BACK: Some erythema over the spinous processes but without skin breakdown  .  Results Review:    I reviewed the patient's new clinical results.      Results from last 7 days  Lab Units 09/03/18  1149   WBC 10*3/mm3 8.63   HEMOGLOBIN g/dL 16.3*   PLATELETS 10*3/mm3 119*       Results from last 7 days  Lab Units 09/03/18  1149   SODIUM mmol/L 141   POTASSIUM mmol/L 5.0   CHLORIDE mmol/L 99   CO2 mmol/L 27.3   BUN mg/dL 16   CREATININE mg/dL 0.61   CALCIUM mg/dL 8.6   GLUCOSE mg/dL 139*         Hemoglobin A1C:  Lab Results   Component Value Date    HGBA1C 5.70 (H) 12/02/2016       Glucose Range:  Glucose   Date/Time Value Ref Range Status   09/03/2018 1151 134 (H) 70 - 130 mg/dL Final       Assessment/Plan     Principal Problem:    Acute on chronic respiratory failure with hypercapnia (CMS/HCC)  Active Problems:    Hyperglycemia    COPD exacerbation (CMS/HCC)    Dehydration    Protein-calorie malnutrition (CMS/HCC)    Emphysema of lung (CMS/HCC)      Assessment & Plan  1. Acute on chronic respiratory failure with hypercarbia and hypoxia: Continue with oxygen at 2-3 L, DuoNeb nebs, IV steroids  2. Acute exacerbation of COPD: Likely viral illness based upon history.  Respiratory viral panel is pending.  No antibiotics to be added currently.   3. Hyperglycemia: Check hemoglobin A1c  4. Dehydration: Reflection of poor oral intake continue IV fluids recheck labs in a.m.  5. Protein calorie malnutrition: Add supplements  6. Tobacco use: Discouraged from using      I discussed the patients findings and my recommendations with patient.          Lucia Jimenez MD  [facility]  09/03/18  7:21 PM

## 2018-09-03 NOTE — ED NOTES
Pt to ED with c/o increasing weakness since last week, pt seen here for COPD exacerbation and bronchitis, finished medrol dosepak and levaquin yesterday. Pt still has NP cough present, denies worse in severity today, pt reports unable to walk independently like normal at home. Denies n/v/d, CP, fevers, chills. Pt has expiratory wheezes, very tight. Family states pt is supposed to wear home 02 at 2-3L and take inhalers, pt is non-compliant, does not wear 02 but has worn in last few days since feeling worse. States normally 02 runs in the 80's. Pt does not see pulmonary specialist.      Nilda Martinez, RN  09/03/18 5664

## 2018-09-04 PROBLEM — J20.6 ACUTE BRONCHITIS DUE TO RHINOVIRUS: Status: ACTIVE | Noted: 2018-09-04

## 2018-09-04 PROBLEM — E86.0 DEHYDRATION: Status: RESOLVED | Noted: 2018-09-03 | Resolved: 2018-09-04

## 2018-09-04 LAB
ALBUMIN SERPL-MCNC: 3.6 G/DL (ref 3.5–5.2)
ANION GAP SERPL CALCULATED.3IONS-SCNC: 9.2 MMOL/L
B PERT DNA SPEC QL NAA+PROBE: NOT DETECTED
BUN BLD-MCNC: 16 MG/DL (ref 8–23)
BUN/CREAT SERPL: 36.4 (ref 7–25)
C PNEUM DNA NPH QL NAA+NON-PROBE: NOT DETECTED
CALCIUM SPEC-SCNC: 8.3 MG/DL (ref 8.6–10.5)
CHLORIDE SERPL-SCNC: 102 MMOL/L (ref 98–107)
CO2 SERPL-SCNC: 33.8 MMOL/L (ref 22–29)
CREAT BLD-MCNC: 0.44 MG/DL (ref 0.57–1)
FLUAV H1 2009 PAND RNA NPH QL NAA+PROBE: NOT DETECTED
FLUAV H1 HA GENE NPH QL NAA+PROBE: NOT DETECTED
FLUAV H3 RNA NPH QL NAA+PROBE: NOT DETECTED
FLUAV SUBTYP SPEC NAA+PROBE: NOT DETECTED
FLUBV RNA ISLT QL NAA+PROBE: NOT DETECTED
GFR SERPL CREATININE-BSD FRML MDRD: 139 ML/MIN/1.73
GLUCOSE BLD-MCNC: 153 MG/DL (ref 65–99)
HADV DNA SPEC NAA+PROBE: NOT DETECTED
HCOV 229E RNA SPEC QL NAA+PROBE: NOT DETECTED
HCOV HKU1 RNA SPEC QL NAA+PROBE: NOT DETECTED
HCOV NL63 RNA SPEC QL NAA+PROBE: NOT DETECTED
HCOV OC43 RNA SPEC QL NAA+PROBE: NOT DETECTED
HMPV RNA NPH QL NAA+NON-PROBE: NOT DETECTED
HPIV1 RNA SPEC QL NAA+PROBE: NOT DETECTED
HPIV2 RNA SPEC QL NAA+PROBE: NOT DETECTED
HPIV3 RNA NPH QL NAA+PROBE: NOT DETECTED
HPIV4 P GENE NPH QL NAA+PROBE: NOT DETECTED
M PNEUMO IGG SER IA-ACNC: NOT DETECTED
PHOSPHATE SERPL-MCNC: 2.4 MG/DL (ref 2.5–4.5)
POTASSIUM BLD-SCNC: 4.1 MMOL/L (ref 3.5–5.2)
RHINOVIRUS RNA SPEC NAA+PROBE: DETECTED
RSV RNA NPH QL NAA+NON-PROBE: NOT DETECTED
SODIUM BLD-SCNC: 145 MMOL/L (ref 136–145)

## 2018-09-04 PROCEDURE — 25010000002 ENOXAPARIN PER 10 MG: Performed by: HOSPITALIST

## 2018-09-04 PROCEDURE — 80069 RENAL FUNCTION PANEL: CPT | Performed by: HOSPITALIST

## 2018-09-04 PROCEDURE — 94799 UNLISTED PULMONARY SVC/PX: CPT

## 2018-09-04 PROCEDURE — 25010000002 METHYLPREDNISOLONE PER 40 MG: Performed by: HOSPITALIST

## 2018-09-04 RX ORDER — CHOLECALCIFEROL (VITAMIN D3) 125 MCG
10 CAPSULE ORAL NIGHTLY
Status: DISCONTINUED | OUTPATIENT
Start: 2018-09-04 | End: 2018-09-06 | Stop reason: HOSPADM

## 2018-09-04 RX ORDER — SENNA AND DOCUSATE SODIUM 50; 8.6 MG/1; MG/1
2 TABLET, FILM COATED ORAL 2 TIMES DAILY
Status: DISCONTINUED | OUTPATIENT
Start: 2018-09-04 | End: 2018-09-06 | Stop reason: HOSPADM

## 2018-09-04 RX ADMIN — ENOXAPARIN SODIUM 40 MG: 40 INJECTION SUBCUTANEOUS at 18:06

## 2018-09-04 RX ADMIN — GUAIFENESIN 600 MG: 600 TABLET, EXTENDED RELEASE ORAL at 05:49

## 2018-09-04 RX ADMIN — METHYLPREDNISOLONE SODIUM SUCCINATE 30 MG: 40 INJECTION, POWDER, LYOPHILIZED, FOR SOLUTION INTRAMUSCULAR; INTRAVENOUS at 18:07

## 2018-09-04 RX ADMIN — IPRATROPIUM BROMIDE AND ALBUTEROL SULFATE 3 ML: .5; 3 SOLUTION RESPIRATORY (INHALATION) at 07:46

## 2018-09-04 RX ADMIN — Medication 10 MG: at 20:00

## 2018-09-04 RX ADMIN — TRAMADOL HYDROCHLORIDE 50 MG: 50 TABLET, FILM COATED ORAL at 05:56

## 2018-09-04 RX ADMIN — SODIUM CHLORIDE 75 ML/HR: 9 INJECTION, SOLUTION INTRAVENOUS at 08:22

## 2018-09-04 RX ADMIN — IPRATROPIUM BROMIDE AND ALBUTEROL SULFATE 3 ML: .5; 3 SOLUTION RESPIRATORY (INHALATION) at 18:59

## 2018-09-04 RX ADMIN — GUAIFENESIN 600 MG: 600 TABLET, EXTENDED RELEASE ORAL at 18:06

## 2018-09-04 RX ADMIN — TRAMADOL HYDROCHLORIDE 50 MG: 50 TABLET, FILM COATED ORAL at 18:14

## 2018-09-04 RX ADMIN — IPRATROPIUM BROMIDE AND ALBUTEROL SULFATE 3 ML: .5; 3 SOLUTION RESPIRATORY (INHALATION) at 13:00

## 2018-09-04 RX ADMIN — DOCUSATE SODIUM -SENNOSIDES 2 TABLET: 50; 8.6 TABLET, COATED ORAL at 20:00

## 2018-09-04 RX ADMIN — METHYLPREDNISOLONE SODIUM SUCCINATE 30 MG: 40 INJECTION, POWDER, LYOPHILIZED, FOR SOLUTION INTRAMUSCULAR; INTRAVENOUS at 05:49

## 2018-09-04 NOTE — PROGRESS NOTES
Discharge Planning Assessment  [facility]     Patient Name: Latisha Amaya  MRN: [id number]  Today's Date: 9/4/2018    Admit Date: 9/3/2018          Discharge Needs Assessment     Row Name 09/04/18 1508       Living Environment    Lives With child(lynn), adult;other relative(s)    Current Living Arrangements home/apartment/condo    Primary Care Provided by self;child(lynn)    Provides Primary Care For no one, unable/limited ability to care for self    Family Caregiver if Needed child(lynn), adult    Quality of Family Relationships helpful;involved;supportive    Able to Return to Prior Arrangements yes       Resource/Environmental Concerns    Resource/Environmental Concerns none    Transportation Concerns car, none       Transition Planning    Patient/Family Anticipates Transition to home with family;home with help/services;inpatient rehabilitation facility    Patient/Family Anticipated Services at Transition rehabilitation services    Transportation Anticipated family or friend will provide       Discharge Needs Assessment    Readmission Within the Last 30 Days no previous admission in last 30 days    Concerns to be Addressed denies needs/concerns at this time;discharge planning;compliance issue    Equipment Currently Used at Home walker, rolling;oxygen;wheelchair    Anticipated Changes Related to Illness inability to care for self   pt requesting nursing to use bed pan    Equipment Needed After Discharge nebulizer    Discharge Facility/Level of Care Needs home with home health;rehabilitation facility    Offered/Gave Vendor List yes    Current Discharge Risk chronically ill            Discharge Plan     Row Name 09/04/18 1502       Plan    Plan Await PT eval-    Patient/Family in Agreement with Plan yes    Plan Comments Spoke with patient at bedside, introduced self and explained CCP role. Verified facesheet and confirmed local pharmacy is [facility]. Pt is independent with walker at home prior to  "admission. Pt lives in a single story home with no steps to enter and she lives with her daughter Carlos Amaya and son-in-law Lorenzo Sebastian. Pt states she is current with O2 from [place], but \"only wears it when she needs to or at night\". Pt does NOT have a nebulizer and would need one at dc if ordered. Pt denies HH as she has her daughters to assist her, but she has been to [facility] in the past. Patient hopes to go home. CCP explained requested PT order for further dc planning. Pt denies advance directive but her 3 daughters would be her decsion makers. Resources and CCP contact info left.. Rene walters/ccp        Destination     No service coordination in this encounter.      Durable Medical Equipment     No service coordination in this encounter.      Dialysis/Infusion     No service coordination in this encounter.      Home Medical Care     No service coordination in this encounter.      Social Care     No service coordination in this encounter.                Demographic Summary     Row Name 09/04/18 1506       General Information    Admission Type inpatient    Referral Source admission list    Reason for Consult discharge planning    Preferred Language English     Used During This Interaction no       Contact Information    Permission Granted to Share Info With family/designee            Functional Status     Row Name 09/04/18 1508       Functional Status    Usual Activity Tolerance good    Current Activity Tolerance fair       Functional Status, IADL    Medications independent    Meal Preparation assistive person    Housekeeping assistive person    Laundry assistive person    Shopping assistive person    IADL Comments Pt very thin appearing.       Mental Status    General Appearance WDL ex;appearance   very thin       Mental Status Summary    Recent Changes in Mental Status/Cognitive Functioning no changes       Employment/    Employment Status retired            Psychosocial    No " documentation.           Abuse/Neglect    No documentation.           Legal     Row Name 09/04/18 1510       Financial/Legal    Finance Comments pt denies advance directive, provided information.             Substance Abuse    No documentation.           Patient Forms     Row Name 09/04/18 1511       Patient Forms    Provider Choice List Delivered    Delivered to Patient    Method of delivery In person          Brandie Cano RN

## 2018-09-04 NOTE — PROGRESS NOTES
"Adult Nutrition  Assessment/PES    Patient Name:  Latisha Amaya  YOB: 1940  MRN: [id number]  Admit Date:  9/3/2018    Assessment Date:  9/4/2018    Comments:  Assessment triggered by BMI < 19.  Patient with COPD.  75% x 1 meal.  Noncompliant with medicines and home O2 per chart.    Patient with severe muscle wasting and fat loss on exam.  Reports UBW 83# and has weighed this for 2-3 years.  No recent weight loss reported.  Eats very small amounts for breakfast and lunch (snack size portions) and eats better at dinner per her report d/t her daughter always cooks her a nice meal at dinner time.  Does not like oral nutrition supplements.  Agrees to try protein powder mixed in a milkshake daily and mixed in oatmeal daily.    MSA completed for severe chronic illness malnutrition.    RD to continue to follow.          Reason for Assessment     Row Name 09/04/18 1559          Reason for Assessment    Reason For Assessment identified at risk by screening criteria     Diagnosis pulmonary disease     Identified At Risk by Screening Criteria BMI             Nutrition/Diet History     Row Name 09/04/18 1559          Nutrition/Diet History    Typical Food/Fluid Intake reports very small meals at breakfast and lunch, but eats a pretty good dinner cooked by her daughter everyday     Supplemental Drinks/Foods/Additives does not like any oral nutrition supplements, agrees to protein powder in milkshake and oatmeal BID             Anthropometrics     Row Name 09/04/18 1600          Anthropometrics    Height 152.4 cm (60\")     Weight 36.7 kg (80 lb 14.5 oz)        Admit Weight    Admit Weight --   81# 9/3        Ideal Body Weight (IBW)    Ideal Body Weight (IBW) (kg) 45.86     % Ideal Body Weight 80.03     % of Ideal Body Weight Assessment 80-90%: mild deficit   80%        Usual Body Weight (UBW)    Usual Body Weight 37.6 kg (83 lb)     % Usual Body Weight 97.48        Body Mass Index (BMI)    BMI (kg/m2) 15.83     " "BMI Assessment BMI less than 16: protein-energy malnutrition grade III        IBW Adjustment, Para/Tetraplegia    5% Adjustment, Para (IBW) 43.57     10% Adjustment, Para (IBW) 41.27     10% Adjustment, Tetra (IBW) 41.27     15% Adjustment, Tetra (IBW) 38.98             Labs/Tests/Procedures/Meds     Row Name 09/04/18 1602          Labs/Procedures/Meds    Lab Results Reviewed reviewed, pertinent     Lab Results Comments Gluc, Creat, Ca, Phos        Diagnostic Tests/Procedures    Diagnostic Test/Procedure Reviewed reviewed, pertinent        Medications    Pertinent Medications Reviewed reviewed, pertinent     Pertinent Medications Comments solu-medrol             Physical Findings     Row Name 09/04/18 1603          Physical Findings    Overall Physical Appearance generalized wasting;loss of subcutaneous fat;loss of muscle mass;underweight     Skin --   B=19, bruised             Estimated/Assessed Needs     Row Name 09/04/18 1603 09/04/18 1600       Calculation Measurements    Weight Used For Calculations 36.7 kg (80 lb 14.5 oz)  --    Height  -- 152.4 cm (60\")       Estimated/Assessed Needs    Additional Documentation KCAL/KG (Group);Protein Requirements (Group);Fluid Requirements (Group)  --       KCAL/KG    14 Kcal/Kg (kcal) 513.8  --    15 Kcal/Kg (kcal) 550.5  --    18 Kcal/Kg (kcal) 660.6  --    20 Kcal/Kg (kcal) 734  --    25 Kcal/Kg (kcal) 917.5  --    30 Kcal/Kg (kcal) 1101  --    35 Kcal/Kg (kcal) 1284.5  --    40 Kcal/Kg (kcal) 1468  --    45 Kcal/Kg (kcal) 1651.5  --    50 Kcal/Kg (kcal) 1835  --    kcal/kg (Specify) --   2464-4671  --       Rappahannock-St. Jeor Equation    RMR (Rappahannock-St. Jeor Equation) 778.5  --       Protein Requirements    Est Protein Requirement Amount (gms/kg) 1.5 gm protein  --    Estimated Protein Requirements (gms/day) 55.05  --       Fluid Requirements    Estimated Fluid Requirements (mL/day) 1101  --    Estimated Fluid Requirement Method RDA Method  --    RDA Method (mL) 1101  -- " "   Tariq-Daniel Method (over 20 kg) 2234  --            Nutrition Prescription Ordered     Row Name 09/04/18 1604          Nutrition Prescription PO    Current PO Diet Regular             Evaluation of Received Nutrient/Fluid Intake     Row Name 09/04/18 1604 09/04/18 1603       Calculation Measurements    Weight Used For Calculations  -- 36.7 kg (80 lb 14.5 oz)       PO Evaluation    Number of Meals 1  --    % PO Intake 75   100% x 1 snack  --    Row Name 09/04/18 1600          Calculation Measurements    Height 152.4 cm (60\")             Evaluation of Prescribed Nutrient/Fluid Intake     Row Name 09/04/18 1603 09/04/18 1600       Calculation Measurements    Weight Used For Calculations 36.7 kg (80 lb 14.5 oz)  --    Height  -- 152.4 cm (60\")            Malnutrition Severity Assessment     Row Name 09/04/18 1604          Malnutrition Severity Assessment    Malnutrition Type Chronic Illness Malnutrition        Physical Signs of Malnutrition (Chronic)    Muscle Wasting Severe   severe clavicle region, temporal region, interosseous region, patellar region; moderate acromion region     Fat Loss Severe   severe orbital region, thoracic region, triceps region        Weight Status (Chronic)    BMI Severe (<16)     %IBW Mod <80%        Energy Intake Status (Chronic)    Energy Intake Mod (<75% / > or equal to 1 mo)        Criteria Met (Must meet criteria for severity in at least 2 of these categories: M Wasting, Fat Loss, Fluid, Secondary Signs, Wt. Status, Intake)    Patient meets criteria for  Severe malnutrition         Problem/Interventions:        Problem 1     Row Name 09/04/18 1605          Nutrition Diagnoses Problem 1    Problem 1 Malnutrition     Etiology (related to) Medical Diagnosis     Pulmonary/Critical Care COPD     Signs/Symptoms (evidenced by) Report/Observation;% IBW;BMI     BMI Less than 16     Percent (%) IBW 80 %                     Intervention Goal     [facility] Name 09/04/18 1606          Intervention " Goal    General Maintain nutrition;Reduce/improve symptoms;Disease management/therapy;Meet nutritional needs for age/condition     PO Maintain intake     Weight Appropriate weight gain             Nutrition Intervention     Row Name 09/04/18 1606          Nutrition Intervention    RD/Tech Action Follow Tx progress;Care plan reviewd;Encourage intake;Recommend/ordered     Recommended/Ordered Supplement             Nutrition Prescription     Row Name 09/04/18 1606          Nutrition Prescription PO    PO Prescription Begin/change supplement     Supplement PRO powder     Supplement Frequency 2 times a day     New PO Prescription Ordered? Yes             Education/Evaluation     Row Name 09/04/18 1606          Education    Education Will Instruct as appropriate        Monitor/Evaluation    Monitor Per protocol;PO intake;Supplement intake;Pertinent labs;Weight         Electronically signed by:  Sofi Valdes RD  09/04/18 4:07 PM

## 2018-09-04 NOTE — PLAN OF CARE
Problem: Patient Care Overview  Goal: Plan of Care Review  Outcome: Ongoing (interventions implemented as appropriate)   09/04/18 2108   Coping/Psychosocial   Plan of Care Reviewed With patient   Plan of Care Review   Progress improving   OTHER   Outcome Summary VSS. A&Ox4. NO falls.(+) Rhinovirus- droplet precautions. Movement/independence encouraged. Pt consulted. Will CTM.

## 2018-09-04 NOTE — PLAN OF CARE
Problem: Patient Care Overview  Goal: Plan of Care Review  Outcome: Ongoing (interventions implemented as appropriate)   09/04/18 0452   Coping/Psychosocial   Plan of Care Reviewed With patient   Plan of Care Review   Progress no change   OTHER   Outcome Summary AAOx4. No falls. 3L NC throughout shift. Intermittent productive cough of green sputum. No complaints of SOA. Intermittent complaints of pain in upper chest and back r/t cough. Assist x1 with bedpan. Will continue to monitor.        Problem: Breathing Pattern Ineffective (Adult)  Goal: Effective Oxygenation/Ventilation  Outcome: Ongoing (interventions implemented as appropriate)    Goal: Anxiety/Fear Reduction  Outcome: Ongoing (interventions implemented as appropriate)      Problem: Fall Risk (Adult)  Goal: Absence of Fall  Outcome: Ongoing (interventions implemented as appropriate)

## 2018-09-04 NOTE — PLAN OF CARE
Problem: Nutrition, Imbalanced: Inadequate Oral Intake (Adult)  Intervention: Promote/Optimize Nutrition   09/04/18 1610   Nutrition Interventions   Oral Nutrition Promotion calorie dense liquids provided;nutritional therapy counseling provided; MSA completed for severe malnutrition; encouraged PO intake; added protein powder in milkshake daily and oatmeal daily for patient to try

## 2018-09-04 NOTE — PROGRESS NOTES
"    DAILY PROGRESS NOTE  [facility]    Patient Identification:  Name: Latisha Amaya  Age: 77 y.o.  Sex: female  :  1940  MRN: [id number]         Primary Care Physician: Dennise Chu MD    Subjective:  Interval History: feeling and breathing better - nausea improved - no emesis/d/cp     Objective:no fm - d/w rn     Scheduled Meds:    enoxaparin 40 mg Subcutaneous Q24H   guaiFENesin 600 mg Oral Q12H   ipratropium-albuterol 3 mL Nebulization Q6H - RT   methylPREDNISolone sodium succinate 30 mg Intravenous Q12H     Continuous Infusions:     Vital signs in last 24 hours:  Temp:  [98 °F (36.7 °C)-99.5 °F (37.5 °C)] 99.5 °F (37.5 °C)  Heart Rate:  [] 106  Resp:  [16-20] 20  BP: (101-121)/(55-77) 121/66    Intake/Output:    Intake/Output Summary (Last 24 hours) at 18 1638  Last data filed at 18 0822   Gross per 24 hour   Intake             1520 ml   Output                0 ml   Net             1520 ml       Exam:  /66 (BP Location: Right arm, Patient Position: Lying)   Pulse 106   Temp 99.5 °F (37.5 °C) (Oral)   Resp 20   Ht 152.4 cm (60\")   Wt 36.7 kg (80 lb 14.5 oz)   SpO2 92%   BMI 15.80 kg/m²     General Appearance:    Alert, cooperative, no distress, AAOx3                         Throat:   Lips, tongue, gums normal; oral mucosa pink and moist                           Neck:   Supple, symmetrical, trachea midline, no JVD                         Lungs:    Still quite tight w/ improving AE to auscultation bilaterally, respirations unlabored                 Chest Wall:    Barrel chested                           Heart:    Regular rate and rhythm, S1 and S2 normal                  Abdomen:     Soft, non-tender, bowel sounds active                 Extremities:   No cyanosis or edema                          Data Review:  Labs in chart were reviewed.    Assessment:  Active Hospital Problems    Diagnosis Date Noted   • **Acute on chronic respiratory failure with " hypercapnia (CMS/[facility]) [J96.22] 09/03/2018   • Acute bronchitis due to Rhinovirus [J20.6] 09/04/2018   • COPD exacerbation (CMS/[facility]) [J44.1] 09/03/2018   • Protein-calorie malnutrition (CMS/[facility]) [E46] 09/03/2018   • Emphysema of lung (CMS/[facility]) [J43.9] 09/03/2018   • Hyperglycemia [R73.9] 12/01/2016      Resolved Hospital Problems    Diagnosis Date Noted Date Resolved   • Dehydration [E86.0] 09/03/2018 09/04/2018       Plan:    AECOPD secondary to rhinovirus bronchitis - improving - continue same - declines Nicoderm     SLIVF     C/CHRF - at baseline o2    Hyperglycemia - A1c 6.2    PT eval     CCP for DC needs - ready in couple days    Ryland Lemons MD  9/4/2018  4:38 PM

## 2018-09-05 PROCEDURE — 94799 UNLISTED PULMONARY SVC/PX: CPT

## 2018-09-05 PROCEDURE — 25010000002 ENOXAPARIN PER 10 MG: Performed by: HOSPITALIST

## 2018-09-05 PROCEDURE — 97110 THERAPEUTIC EXERCISES: CPT

## 2018-09-05 PROCEDURE — 25010000002 METHYLPREDNISOLONE PER 40 MG: Performed by: HOSPITALIST

## 2018-09-05 PROCEDURE — 97162 PT EVAL MOD COMPLEX 30 MIN: CPT

## 2018-09-05 RX ORDER — FAMOTIDINE 20 MG/1
20 TABLET, FILM COATED ORAL DAILY
Status: DISCONTINUED | OUTPATIENT
Start: 2018-09-05 | End: 2018-09-06 | Stop reason: HOSPADM

## 2018-09-05 RX ORDER — PREDNISONE 20 MG/1
40 TABLET ORAL
Status: DISCONTINUED | OUTPATIENT
Start: 2018-09-06 | End: 2018-09-06 | Stop reason: HOSPADM

## 2018-09-05 RX ADMIN — GUAIFENESIN 600 MG: 600 TABLET, EXTENDED RELEASE ORAL at 06:15

## 2018-09-05 RX ADMIN — METHYLPREDNISOLONE SODIUM SUCCINATE 30 MG: 40 INJECTION, POWDER, LYOPHILIZED, FOR SOLUTION INTRAMUSCULAR; INTRAVENOUS at 06:15

## 2018-09-05 RX ADMIN — IPRATROPIUM BROMIDE AND ALBUTEROL SULFATE 3 ML: .5; 3 SOLUTION RESPIRATORY (INHALATION) at 19:18

## 2018-09-05 RX ADMIN — Medication 10 MG: at 21:15

## 2018-09-05 RX ADMIN — IPRATROPIUM BROMIDE AND ALBUTEROL SULFATE 3 ML: .5; 3 SOLUTION RESPIRATORY (INHALATION) at 06:53

## 2018-09-05 RX ADMIN — ENOXAPARIN SODIUM 40 MG: 40 INJECTION SUBCUTANEOUS at 18:17

## 2018-09-05 RX ADMIN — TRAMADOL HYDROCHLORIDE 50 MG: 50 TABLET, FILM COATED ORAL at 12:53

## 2018-09-05 RX ADMIN — DOCUSATE SODIUM -SENNOSIDES 2 TABLET: 50; 8.6 TABLET, COATED ORAL at 21:15

## 2018-09-05 RX ADMIN — IPRATROPIUM BROMIDE AND ALBUTEROL SULFATE 3 ML: .5; 3 SOLUTION RESPIRATORY (INHALATION) at 12:39

## 2018-09-05 RX ADMIN — DOCUSATE SODIUM -SENNOSIDES 2 TABLET: 50; 8.6 TABLET, COATED ORAL at 08:09

## 2018-09-05 RX ADMIN — GUAIFENESIN 600 MG: 600 TABLET, EXTENDED RELEASE ORAL at 18:17

## 2018-09-05 NOTE — NURSING NOTE
Left had IV site evaluated when d/c .  Discussed redness and tenderness wit primary nurse.  Recommend monitoring site closely for any changes.

## 2018-09-05 NOTE — PROGRESS NOTES
"    DAILY PROGRESS NOTE  [facility]    Patient Identification:  Name: Latisha Amaya  Age: 77 y.o.  Sex: female  :  1940  MRN: [id number]         Primary Care Physician: Dennise Chu MD    Subjective:  Interval History: feeling and breathing better - no cp/n/v/ams - no nebulizer at home     Objective:no fm     Scheduled Meds:  enoxaparin 40 mg Subcutaneous Q24H   guaiFENesin 600 mg Oral Q12H   ipratropium-albuterol 3 mL Nebulization Q6H - RT   melatonin 10 mg Oral Nightly   [START ON 2018] predniSONE 40 mg Oral Daily With Breakfast   sennosides-docusate sodium 2 tablet Oral BID     Continuous Infusions:     Vital signs in last 24 hours:  Temp:  [98.5 °F (36.9 °C)-99.5 °F (37.5 °C)] 98.7 °F (37.1 °C)  Heart Rate:  [] 78  Resp:  [18-20] 18  BP: (115-121)/(61-75) 116/75    Intake/Output:    Intake/Output Summary (Last 24 hours) at 18 1038  Last data filed at 18 0900   Gross per 24 hour   Intake              530 ml   Output              600 ml   Net              -70 ml       Exam:  /75 (BP Location: Right arm, Patient Position: Lying)   Pulse 78   Temp 98.7 °F (37.1 °C) (Oral)   Resp 18   Ht 152.4 cm (60\")   Wt 42.6 kg (93 lb 14.7 oz)   SpO2 97%   BMI 18.34 kg/m²     General Appearance:    Alert, cooperative, no distress, AAOx3, clinically better                          Throat:   Lips, tongue, gums normal; oral mucosa pink and moist                           Neck:   Supple, symmetrical, trachea midline, no JVD                         Lungs:    No wheeze w/ continued improved AE to bases and o/w Clear to auscultation bilaterally, respirations unlabored                 Chest Wall:    Barrel chested                           Heart:    Regular rate and rhythm, S1 and S2 normal                  Abdomen:     Soft, non-tender, bowel sounds active                 Extremities:   No cyanosis or edema                        Data Review:  Labs in chart were " reviewed.    Assessment:  Active Hospital Problems    Diagnosis Date Noted   • **Acute on chronic respiratory failure with hypercapnia (CMS/[facility]) [J96.22] 09/03/2018   • Acute bronchitis due to Rhinovirus [J20.6] 09/04/2018   • COPD exacerbation (CMS/[facility]) [J44.1] 09/03/2018   • Protein-calorie malnutrition (CMS/[facility]) [E46] 09/03/2018   • Emphysema of lung (CMS/[facility]) [J43.9] 09/03/2018   • Hyperglycemia [R73.9] 12/01/2016      Resolved Hospital Problems    Diagnosis Date Noted Date Resolved   • Dehydration [E86.0] 09/03/2018 09/04/2018       Plan:  AECOPD secondary to rhinovirus bronchitis - improving - change to prednisone in am - declines Nicoderm     -convert to HFA at DC - declines nebulizer      A/CHRF - at baseline o2     Hyperglycemia - A1c 6.2     PT eval     SennaS for constipation      CCP for DC needs - ready tomorrow - plans for HFA inhaler at DC     Ryland Lemons MD  9/5/2018  10:38 AM

## 2018-09-05 NOTE — PLAN OF CARE
Problem: Patient Care Overview  Goal: Plan of Care Review  Outcome: Ongoing (interventions implemented as appropriate)   09/05/18 0301   Coping/Psychosocial   Plan of Care Reviewed With patient   Plan of Care Review   Progress improving   OTHER   Outcome Summary No c/o pain. VSS Restful night. Maintain isolation Will continue to monitor.     Goal: Individualization and Mutuality  Outcome: Ongoing (interventions implemented as appropriate)    Goal: Discharge Needs Assessment  Outcome: Ongoing (interventions implemented as appropriate)    Goal: Interprofessional Rounds/Family Conf  Outcome: Ongoing (interventions implemented as appropriate)      Problem: Breathing Pattern Ineffective (Adult)  Goal: Identify Related Risk Factors and Signs and Symptoms  Outcome: Ongoing (interventions implemented as appropriate)    Goal: Anxiety/Fear Reduction  Outcome: Ongoing (interventions implemented as appropriate)      Problem: Fall Risk (Adult)  Goal: Identify Related Risk Factors and Signs and Symptoms  Outcome: Ongoing (interventions implemented as appropriate)    Goal: Absence of Fall  Outcome: Ongoing (interventions implemented as appropriate)      Problem: Nutrition, Imbalanced: Inadequate Oral Intake (Adult)  Goal: Identify Related Risk Factors and Signs and Symptoms  Outcome: Ongoing (interventions implemented as appropriate)    Goal: Improved Oral Intake  Outcome: Ongoing (interventions implemented as appropriate)    Goal: Prevent Further Weight Loss  Outcome: Ongoing (interventions implemented as appropriate)

## 2018-09-05 NOTE — PROGRESS NOTES
"Continued Stay Note  [facility]     Patient Name: Latisha Amaya  MRN: [id number]  Today's Date: 9/5/2018    Admit Date: 9/3/2018          Discharge Plan     Row Name 09/05/18 1153       Plan    Plan Home with [facility] pending PT eval and Nebulizer DME from [facility] [facility]    Patient/Family in Agreement with Plan yes    Plan Comments Recieved in bound call from daughter Jeanne Nieves ([phone]) and she states pt is unable to use \"Inhaler even with a spacer\" and pt is confused and needs a Nebulizer with family assistance. Jeanne states pt is current with oxygen from [facility] and requests Nebulzier to be ordered from [facility]. Also requests Home Health with [facility] Referral. CCP called Nilda/Hospitalist Cooridinator to notify MD of nebulizer need and hh. Spoke with Chelsy/Scooby Douglass for HH referral. Pending PT eval. possible dc tomorrow. rusty walters/ccp              Discharge Codes    No documentation.           Brandie Cano RN    "

## 2018-09-05 NOTE — THERAPY EVALUATION
Acute Care - Physical Therapy Initial Evaluation  [facility]     Patient Name: Latisha Amaya  : 1940  MRN: [id number]  Today's Date: 2018      Date of Referral to PT: 18  Referring Physician: Dr. Lemons      Admit Date: 9/3/2018    Visit Dx:     ICD-10-CM ICD-9-CM   1. COPD exacerbation (CMS/HCC) J44.1 491.21   2. Impaired functional mobility, balance, gait, and endurance Z74.09 V49.89     Patient Active Problem List   Diagnosis   • Acute respiratory failure with hypoxia (CMS/HCC)   • Chronic respiratory failure with hypercapnia (CMS/HCC)   • Hyperglycemia   • Chronic back pain   • Malnutrition of moderate degree ([place]: 60% to less than 75% of standard weight) (CMS/HCC)   • COPD exacerbation (CMS/HCC)   • Acute on chronic respiratory failure with hypercapnia (CMS/HCC)   • Protein-calorie malnutrition (CMS/HCC)   • Emphysema of lung (CMS/HCC)   • Acute bronchitis due to Rhinovirus     Past Medical History:   Diagnosis Date   • Back pain    • COPD (chronic obstructive pulmonary disease) (CMS/HCC)    • Emphysema of lung (CMS/HCC)    • Pneumonia     3 times since 2016     Past Surgical History:   Procedure Laterality Date   • ABDOMINAL SURGERY     • HYSTERECTOMY          PT ASSESSMENT (last 12 hours)      Physical Therapy Evaluation     Row Name 18 1400          PT Evaluation Time/Intention    Subjective Information complains of;fatigue;weakness  -MA     Document Type evaluation  -MA     Mode of Treatment physical therapy  -MA     Patient Effort adequate  -MA     Symptoms Noted During/After Treatment shortness of breath;fatigue   mild chest pain- notified YANA Garnett  -MA     Row Name 18 1400          General Information    Patient Profile Reviewed? yes  -MA     Referring Physician Dr. Lemons  -MA     Patient Observations alert;cooperative;agree to therapy  -MA     General Observations of Patient Supine in bed with HOB elevated, no acute distress noted at rest  -MA     Prior Level of  Function independent:;all household mobility  -MA     Equipment Currently Used at Home walker, rolling  -MA     Pertinent History of Current Functional Problem Admission from home for increasing weakness since last week- COPD exacerbation  -MA     Existing Precautions/Restrictions fall  -MA     Limitations/Impairments safety/cognitive  -MA     Risks Reviewed patient:  -MA     Benefits Reviewed patient:  -MA     Barriers to Rehab previous functional deficit  -MA     Row Name 09/05/18 1400          Cognitive Assessment/Intervention- PT/OT    Orientation Status (Cognition) oriented x 4  -MA     Follows Commands (Cognition) WFL  -MA     Safety Deficit (Cognitive) mild deficit  -MA     Personal Safety Interventions fall prevention program maintained;gait belt;nonskid shoes/slippers when out of bed  -MA     Row Name 09/05/18 1400          Safety Issues, Functional Mobility    Safety Issues Affecting Function (Mobility) safety precautions follow-through/compliance;insight into deficits/self awareness  -MA     Impairments Affecting Function (Mobility) shortness of breath;endurance/activity tolerance;strength  -MA     Row Name 09/05/18 1400          Bed Mobility Assessment/Treatment    Bed Mobility Assessment/Treatment supine-sit;sit-supine  -MA     Supine-Sit [place] (Bed Mobility) supervision;verbal cues  -MA     Sit-Supine [place] (Bed Mobility) not tested  -MA     Bed Mobility, Safety Issues decreased use of legs for bridging/pushing  -MA     Assistive Device (Bed Mobility) bed rails;head of bed elevated  -MA     Row Name 09/05/18 1400          Transfer Assessment/Treatment    Transfer Assessment/Treatment sit-stand transfer;stand-sit transfer  -MA     Comment (Transfers) Hand placement cues  -MA     Sit-Stand [place] (Transfers) contact guard;minimum assist (75% patient effort);verbal cues  -MA     Stand-Sit [place] (Transfers) contact guard;verbal cues  -MA     Row Name 09/05/18 1400           Sit-Stand Transfer    Assistive Device (Sit-Stand Transfers) walker, front-wheeled  -MA     Row Name 09/05/18 1400          Stand-Sit Transfer    Assistive Device (Stand-Sit Transfers) walker, front-wheeled  -MA     Row Name 09/05/18 1400          Gait/Stairs Assessment/Training    Gait/Stairs Assessment/Training gait/ambulation independence  -MA     [place] Level (Gait) contact guard;minimum assist (75% patient effort)  -MA     Assistive Device (Gait) walker, front-wheeled  -MA     Distance in Feet (Gait) 10  -MA     Pattern (Gait) step-to  -MA     Deviations/Abnormal Patterns (Gait) denita decreased;festinating/shuffling;gait speed decreased  -MA     Comment (Gait/Stairs) Fatigued quickly and very SOA- 86% O2 sats post ambulation.  -MA     Row Name 09/05/18 1400          General ROM    GENERAL ROM COMMENTS B LE WFL  -[facility] Name 09/05/18 1400          MMT (Manual Muscle Testing)    General MMT Comments B LEs grossly 4/5  -[facility] Name 09/05/18 1400          Motor Assessment/Intervention    Additional Documentation Balance (Group)  -MA     Row Name 09/05/18 1400          Balance    Balance static sitting balance;static standing balance  -MA     Row Name 09/05/18 1400          Static Sitting Balance    Level of [place] (Unsupported Sitting, Static Balance) standby assist  -MA     Sitting Position (Unsupported Sitting, Static Balance) sitting on edge of bed  -MA     Level of [place] (Supported Sitting, Static Balance) standby assist  -MA     Sitting Position (Supported Sitting, Static Balance) sitting on edge of bed  -MA     Row Name 09/05/18 1400          Static Standing Balance    Level of [place] (Supported Standing, Static Balance) contact guard assist;minimal assist, 75% patient effort  -MA     Assistive Device Utilized (Supported Standing, Static Balance) rolling walker  -MA     Row Name 09/05/18 1400          Sensory Assessment/Intervention    Sensory General Assessment no sensation  deficits identified  -MA     Row Name 09/05/18 1400          Vision Assessment/Intervention    Visual Impairment/Limitations WFL  -MA     Row Name 09/05/18 1400          Pain Assessment    Additional Documentation --   No pain reported.  -MA     Row Name 09/05/18 1400          Plan of Care Review    Plan of Care Reviewed With patient  -MA     Row Name 09/05/18 1400          Physical Therapy Clinical Impression    Date of Referral to PT 09/05/18  -MA     PT Diagnosis (PT Clinical Impression) impaired functional mobility and endurance  -MA     Patient/Family Goals Statement (PT Clinical Impression) Return home  -MA     Criteria for Skilled Interventions Met (PT Clinical Impression) yes;treatment indicated  -MA     Pathology/Pathophysiology Noted (Describe Specifically for Each System) musculoskeletal;pulmonary  -MA     Impairments Found (describe specific impairments) aerobic capacity/endurance;ergonomics and body mechanics;gait, locomotion, and balance  -MA     Rehab Potential (PT Clinical Summary) good, to achieve stated therapy goals  -MA     Care Plan Review (PT) patient/other agree to care plan  -MA     Row Name 09/05/18 1400          Vital Signs    Pre SpO2 (%) 91  -MA     O2 Delivery Pre Treatment supplemental O2  -MA     Intra SpO2 (%) 86  -MA     O2 Delivery Intra Treatment supplemental O2  -MA     Post SpO2 (%) 90  -MA     O2 Delivery Post Treatment supplemental O2  -MA     Row Name 09/05/18 1400          Physical Therapy Goals    Bed Mobility Goal Selection (PT) bed mobility, PT goal 1  -MA     Transfer Goal Selection (PT) transfer, PT goal 1  -MA     Gait Training Goal Selection (PT) gait training, PT goal 1  -MA     Additional Documentation Balance Goal Selection (PT) (Row)  -MA     Row Name 09/05/18 1400          Bed Mobility Goal 1 (PT)    Activity/Assistive Device (Bed Mobility Goal 1, PT) bed mobility activities, all  -MA     [place] Level/Cues Needed (Bed Mobility Goal 1, PT) supervision  required  -MA     Time Frame (Bed Mobility Goal 1, PT) 1 week  -MA     Progress/Outcomes (Bed Mobility Goal 1, PT) goal ongoing  -MA     Row Name 09/05/18 1400          Transfer Goal 1 (PT)    Activity/Assistive Device (Transfer Goal 1, PT) transfers, all;walker, rolling  -MA     [place] Level/Cues Needed (Transfer Goal 1, PT) supervision required  -MA     Time Frame (Transfer Goal 1, PT) 1 week  -MA     Progress/Outcome (Transfer Goal 1, PT) goal ongoing  -MA     Row Name 09/05/18 1400          Gait Training Goal 1 (PT)    Activity/Assistive Device (Gait Training Goal 1, PT) gait (walking locomotion);walker, rolling  -MA     [place] Level (Gait Training Goal 1, PT) supervision required  -MA     Distance (Gait Goal 1, PT) 150  -MA     Time Frame (Gait Training Goal 1, PT) 1 week  -MA     Progress/Outcome (Gait Training Goal 1, PT) goal ongoing  -MA     Row Name 09/05/18 1400          Positioning and Restraints    Pre-Treatment Position in bed  -MA     Post Treatment Position chair  -MA     In Chair notified nsg;sitting;call light within reach;encouraged to call for assist;legs elevated  -MA     Row Name 09/05/18 1400          Living Environment    Home Accessibility --   No stairs  -MA       User Key  (r) = Recorded By, (t) = Taken By, (c) = Cosigned By    Initials Name Provider Type    Ashlee Lundberg, PT Physical Therapist          Physical Therapy Education     Title: PT OT SLP Therapies (Done)     Topic: Physical Therapy (Done)     Point: Mobility training (Done)    Learning Progress Summary     Learner Status Readiness Method Response Comment Documented by    Patient Done Acceptance E VU  MA 09/05/18 1437          Point: Home exercise program (Done)    Learning Progress Summary     Learner Status Readiness Method Response Comment Documented by    Patient Done Acceptance E VU  MA 09/05/18 1437          Point: Body mechanics (Done)    Learning Progress Summary     Learner Status Readiness Method  Response Comment Documented by    Patient Done Acceptance E SHANON  MA 09/05/18 4531          Point: Precautions (Done)    Learning Progress Summary     Learner Status Readiness Method Response Comment Documented by    Patient Done Acceptance E SHANON  MA 09/05/18 0898                      User Key     Initials Effective Dates Name Provider Type Discipline    MA 04/03/18 -  Ashlee Hancock, PT Physical Therapist PT                PT Recommendation and Plan  Anticipated Discharge Disposition (PT): home with home health  Planned Therapy Interventions (PT Eval): balance training, bed mobility training, gait training, home exercise program, patient/family education, postural re-education, stair training, strengthening, transfer training  Therapy Frequency (PT Clinical Impression): daily  Outcome Summary/Treatment Plan (PT)  Anticipated Discharge Disposition (PT): home with home health  Plan of Care Reviewed With: patient  Outcome Summary: Patient is a pleasant 77 y.o. female admitted to [facility] for increasing weakness since last week- COPD exacerbation. Patient is independent with all ADLs and uses a rolling walker at baseline. Patient got up and ambulated 10' with a RW- fatigued quickly and demo'd endurance deficits. May benefit from skilled PT services acutely to address functional deficits and improve independence level.          Outcome Measures     Row Name 09/05/18 1400             How much help from another person do you currently need...    Turning from your back to your side while in flat bed without using bedrails? 4  -MA      Moving from lying on back to sitting on the side of a flat bed without bedrails? 3  -MA      Moving to and from a bed to a chair (including a wheelchair)? 3  -MA      Standing up from a chair using your arms (e.g., wheelchair, bedside chair)? 3  -MA      Climbing 3-5 steps with a railing? 2  -MA      To walk in hospital room? 2  -MA      AM-PAC 6 Clicks Score 17  -MA         Functional  Assessment    Outcome Measure Options AM-PAC 6 Clicks Basic Mobility (PT)  -MA        User Key  (r) = Recorded By, (t) = Taken By, (c) = Cosigned By    Initials Name Provider Type    Ashlee Lundberg, PT Physical Therapist           Time Calculation:         PT Charges     Row Name 09/05/18 1420             Time Calculation    Start Time 1401  -MA      Stop Time 1420  -MA      Time Calculation (min) 19 min  -MA      PT Received On 09/05/18  -MA         Time Calculation- PT    Total Timed Code Minutes- PT 15 minute(s)  -MA        User Key  (r) = Recorded By, (t) = Taken By, (c) = Cosigned By    Initials Name Provider Type    Ashlee Lundberg, PT Physical Therapist        Therapy Suggested Charges     Code   Minutes Charges    None           Therapy Charges for Today     Code Description Service Date Service Provider Modifiers Qty    55971411590  PT EVAL MOD COMPLEXITY 2 9/5/2018 Ashlee Hancock, PT GP 1    05595236273 HC PT THER PROC EA 15 MIN 9/5/2018 Ashlee Hancock, PT GP 1          PT G-Codes  Outcome Measure Options: AM-PAC 6 Clicks Basic Mobility (PT)  AM-PAC 6 Clicks Score: 17      Ashlee Hancock PT  9/5/2018

## 2018-09-05 NOTE — DISCHARGE PLACEMENT REQUEST
"Latisha Castillo (77 y.o. Female)     Date of Birth Social Security Number Address Home Phone MRN    1940  [id number] [id number] [phone] [id number]    [employer] Marital Status          Orthodoxy        Admission Date Admission Type Admitting Provider Attending Provider Department, Room/Bed    9/3/18 Emergency Lucia Jimenez MD [place], Troy Andrew, MD [address], 524/1    Discharge Date Discharge Disposition Discharge Destination                       Attending Provider:  Ryland Lemons MD    Allergies:  Erythromycin, Aspirin, Codeine, Iodine, Latex, Morphine, Penicillins, Sulfa Antibiotics    Isolation:  Droplet   Infection:  Rhinovirus  (18)   Code Status:  CPR    Ht:  152.4 cm (60\")   Wt:  42.6 kg (93 lb 14.7 oz)    Admission Cmt:  None   Principal Problem:  Acute on chronic respiratory failure with hypercapnia (CMS/HCC) [J96.22]                 Active Insurance as of 9/3/2018     Primary Coverage     Payor Plan Insurance Group Employer/Plan Group    ANTH MEDICARE REPLACEMENT [facility] MEDICARE ADVANTAGE [id number]     Payor Plan Address Payor Plan Phone Number Effective From Effective To    [address] [phone] 2016     [facility] [id number]       Subscriber Name Subscriber Birth Date Member ID       LATISHA CASTILLO 1940 [id number]                 Emergency Contacts      (Rel.) Home Phone Work Phone Mobile Phone    Jeanne Nieves (Daughter) -- -- [phone]              "

## 2018-09-05 NOTE — PLAN OF CARE
Problem: Patient Care Overview  Goal: Plan of Care Review   09/05/18 5441   Coping/Psychosocial   Plan of Care Reviewed With patient   OTHER   Outcome Summary Patient is a pleasant 77 y.o. female admitted to [facility] for increasing weakness since last week- COPD exacerbation. Wears supplemental O2 at home. Patient is independent with all ADLs and uses a rolling walker at baseline. Patient got up and ambulated 10' with a RW- fatigued quickly and demo'd endurance deficits. May benefit from skilled PT services acutely to address functional deficits and improve independence level prior to returning home.

## 2018-09-06 VITALS
BODY MASS INDEX: 18.4 KG/M2 | WEIGHT: 93.7 LBS | OXYGEN SATURATION: 90 % | HEIGHT: 60 IN | SYSTOLIC BLOOD PRESSURE: 112 MMHG | RESPIRATION RATE: 18 BRPM | HEART RATE: 95 BPM | TEMPERATURE: 98.4 F | DIASTOLIC BLOOD PRESSURE: 74 MMHG

## 2018-09-06 PROCEDURE — 94799 UNLISTED PULMONARY SVC/PX: CPT

## 2018-09-06 PROCEDURE — 63710000001 PREDNISONE PER 1 MG: Performed by: HOSPITALIST

## 2018-09-06 PROCEDURE — 97110 THERAPEUTIC EXERCISES: CPT

## 2018-09-06 RX ORDER — CHOLECALCIFEROL (VITAMIN D3) 125 MCG
10 CAPSULE ORAL NIGHTLY
Start: 2018-09-06

## 2018-09-06 RX ORDER — SENNA AND DOCUSATE SODIUM 50; 8.6 MG/1; MG/1
2 TABLET, FILM COATED ORAL 2 TIMES DAILY
Start: 2018-09-06

## 2018-09-06 RX ORDER — FAMOTIDINE 20 MG/1
20 TABLET, FILM COATED ORAL DAILY
Start: 2018-09-07

## 2018-09-06 RX ORDER — ALBUTEROL SULFATE 2.5 MG/3ML
2.5 SOLUTION RESPIRATORY (INHALATION) EVERY 4 HOURS PRN
Refills: 12
Start: 2018-09-06

## 2018-09-06 RX ORDER — GUAIFENESIN 600 MG/1
600 TABLET, EXTENDED RELEASE ORAL EVERY 12 HOURS
Start: 2018-09-06

## 2018-09-06 RX ORDER — PREDNISONE 20 MG/1
40 TABLET ORAL
Start: 2018-09-07

## 2018-09-06 RX ADMIN — GUAIFENESIN 600 MG: 600 TABLET, EXTENDED RELEASE ORAL at 06:22

## 2018-09-06 RX ADMIN — PREDNISONE 40 MG: 20 TABLET ORAL at 08:17

## 2018-09-06 RX ADMIN — IPRATROPIUM BROMIDE AND ALBUTEROL SULFATE 3 ML: .5; 3 SOLUTION RESPIRATORY (INHALATION) at 07:01

## 2018-09-06 RX ADMIN — IPRATROPIUM BROMIDE AND ALBUTEROL SULFATE 3 ML: .5; 3 SOLUTION RESPIRATORY (INHALATION) at 12:19

## 2018-09-06 RX ADMIN — TRAMADOL HYDROCHLORIDE 50 MG: 50 TABLET, FILM COATED ORAL at 06:22

## 2018-09-06 RX ADMIN — DOCUSATE SODIUM -SENNOSIDES 2 TABLET: 50; 8.6 TABLET, COATED ORAL at 08:17

## 2018-09-06 NOTE — PLAN OF CARE
Problem: Patient Care Overview  Goal: Plan of Care Review  Outcome: Ongoing (interventions implemented as appropriate)   09/06/18 1024   Coping/Psychosocial   Plan of Care Reviewed With patient   Plan of Care Review   Progress improving   OTHER   Outcome Summary incr amb dist w/o complaint; no pain reported; plans home at [place]

## 2018-09-06 NOTE — THERAPY TREATMENT NOTE
Acute Care - Physical Therapy Treatment Note  [facility]     Patient Name: Latisha Amaya  : 1940  MRN: [id number]  Today's Date: 2018     Date of Referral to PT: 18  Referring Physician: Dr. Lemons    Admit Date: 9/3/2018    Visit Dx:    ICD-10-CM ICD-9-CM   1. COPD exacerbation (CMS/HCC) J44.1 491.21   2. Impaired functional mobility, balance, gait, and endurance Z74.09 V49.89     Patient Active Problem List   Diagnosis   • Acute respiratory failure with hypoxia (CMS/HCC)   • Chronic respiratory failure with hypercapnia (CMS/HCC)   • Hyperglycemia   • Chronic back pain   • Malnutrition of moderate degree ([place]: 60% to less than 75% of standard weight) (CMS/HCC)   • COPD exacerbation (CMS/HCC)   • Acute on chronic respiratory failure with hypercapnia (CMS/HCC)   • Protein-calorie malnutrition (CMS/HCC)   • Emphysema of lung (CMS/HCC)   • Acute bronchitis due to Rhinovirus       Therapy Treatment          Rehabilitation Treatment Summary     Row Name 18 1017             Treatment Time/Intention    Discipline physical therapy assistant  -      Document Type therapy note (daily note)  -      Subjective Information complains of;weakness;fatigue  -      Care Plan Review patient/other agree to care plan  -      Existing Precautions/Restrictions fall;oxygen therapy device and L/min   3L  -      Recorded by [JM] Nilda Pugh PTA 18 1024      Row Name 18 1017             Vital Signs    Pre SpO2 (%) 96  -      O2 Delivery Pre Treatment supplemental O2  -      Post SpO2 (%) 95  -      O2 Delivery Post Treatment supplemental O2  -      Pre Patient Position Sitting  -      Post Patient Position Sitting  -      Recorded by [JM] Nilda Pugh PTA 18 1024      Row Name 18 1017             Bed Mobility Assessment/Treatment    Supine-Sit [place] (Bed Mobility) supervision  -      Assistive Device (Bed Mobility) bed rails  -      Recorded by  [JM] Nilda Pugh, \[facility] [place] [facility]\"" 09/06/18 1024      Row Name 09/06/18 1017             Sit-Stand Transfer    Sit-Stand [place] (Transfers) 2 person assist;contact guard  -      Assistive Device (Sit-Stand Transfers) walker, front-wheeled  -JM      Recorded by [JM] Nilda Pugh, \[facility] [place] [facility]\"" 09/06/18 1024      Row Name 09/06/18 1017             Stand-Sit Transfer    Stand-Sit [place] (Transfers) supervision;verbal cues  -      Assistive Device (Stand-Sit Transfers) walker, front-wheeled  -JM      Recorded by [JM] Nilda Pugh, \[facility] [place] [facility]\"" 09/06/18 1024      Row Name 09/06/18 1017             Gait/Stairs Assessment/Training    [place] Level (Gait) contact guard;verbal cues  -      Assistive Device (Gait) walker, front-wheeled  -      Distance in Feet (Gait) 25  -JM      Deviations/Abnormal Patterns (Gait) denita decreased  -JM      Comment (Gait/Stairs) INCR AMB DIST , JUST SLOW PACED TO CONSERVE ENERGY  -JM      Recorded by [JM] Nilda Pugh, \[facility] [place] [facility]\"" 09/06/18 1024      Row Name 09/06/18 1017             Therapeutic Exercise    Lower Extremity (Therapeutic Exercise) LAQ (long arc quad), bilateral;marching while seated;quad sets, bilateral  -JM      Lower Extremity Range of Motion (Therapeutic Exercise) ankle dorsiflexion/plantar flexion, bilateral  -JM      Comment (Therapeutic Exercise) 10 reps each  -JM      Recorded by [JM] Nilda Pugh, \[facility] [place] [facility]\"" 09/06/18 1024      Row Name 09/06/18 1017             Positioning and Restraints    Pre-Treatment Position in bed   sitting EOB  -JM      In Chair reclined;call light within reach;encouraged to call for assist;exit alarm on;notified nsg  -JM      Recorded by [JM] Nilda Pugh, \[facility] [place] [facility]\"" 09/06/18 1024      Row Name 09/06/18 1017             Pain Scale: Numbers Pre/Post-Treatment    Pain Scale: Numbers, Pretreatment 0/10 - no pain  -JM      Pain Scale: Numbers, Post-Treatment 0/10 - no pain  -JM      Recorded by [JM] Nilda Pugh, \[facility] [place] [facility]\"" 09/06/18 1024        User Key   (r) = Recorded By, (t) = Taken By, (c) = Cosigned By    Initials Name Effective Dates Discipline     Nilda Pugh PTA 03/07/18 -  PT                     Physical Therapy Education     Title: PT OT SLP Therapies (Done)     Topic: Physical Therapy (Done)     Point: Mobility training (Done)    Learning Progress Summary     Learner Status Readiness Method Response Comment Documented by    Patient Done Acceptance EJEROME D [facility] 09/06/18 1025     Done Acceptance E [facility] 09/05/18 1437          Point: Home exercise program (Done)    Learning Progress Summary     Learner Status Readiness Method Response Comment Documented by    Patient Done Acceptance JEROME CHADWICK D VU   09/06/18 1025     Done Acceptance E [facility] 09/05/18 1437          Point: Body mechanics (Done)    Learning Progress Summary     Learner Status Readiness Method Response Comment Documented by    Patient Done Acceptance JEROME CHADWICK D VU   09/06/18 1025     Done Acceptance E [facility] 09/05/18 1437          Point: Precautions (Done)    Learning Progress Summary     Learner Status Readiness Method Response Comment Documented by    Patient Done Acceptance JEROME CHADWICK D VU   09/06/18 1025     Done Acceptance E [facility] 09/05/18 1437                      User Key     Initials Effective Dates Name Provider Type Discipline     03/07/18 -  Nilda Pugh PTA Physical Therapy Assistant PT    MA 04/03/18 -  Ashlee Hancock PT Physical Therapist PT                    PT Recommendation and Plan     Plan of Care Reviewed With: patient  Progress: improving  Outcome Summary: incr amb dist w/o complaint; no pain reported; plans home at [place]          Outcome Measures     Row Name 09/06/18 1000 09/05/18 1400          How much help from another person do you currently need...    Turning from your back to your side while in flat bed without using bedrails? 4  -JM 4  -MA     Moving from lying on back to sitting on the side of a flat bed without bedrails? 3  -JM 3  -MA     Moving to and  from a bed to a chair (including a wheelchair)? 3  -JM 3  -MA     Standing up from a chair using your arms (e.g., wheelchair, bedside chair)? 3  -JM 3  -MA     Climbing 3-5 steps with a railing? 2  -JM 2  -MA     To walk in hospital room? 3  -JM 2  -MA     AM-PAC 6 Clicks Score 18  - 17  -MA        Functional Assessment    Outcome Measure Options  -- AM-PAC 6 Clicks Basic Mobility (PT)  -MA       User Key  (r) = Recorded By, (t) = Taken By, (c) = Cosigned By    Initials Name Provider Type    Nilda Freed PTA Physical Therapy Assistant    Ashlee Lundberg, PT Physical Therapist           Time Calculation:         PT Charges     Row Name 09/06/18 1026             Time Calculation    Start Time 1009  -      Stop Time 1026  -      Time Calculation (min) 17 min  -      PT Received On 09/06/18  -YULY      PT - Next Appointment 09/07/18  -YULY         Time Calculation- PT    Total Timed Code Minutes- PT 17 minute(s)  -        User Key  (r) = Recorded By, (t) = Taken By, (c) = Cosigned By    Initials Name Provider Type    Nilda Freed PTA Physical Therapy Assistant        Therapy Suggested Charges     Code   Minutes Charges    None           Therapy Charges for Today     Code Description Service Date Service Provider Modifiers Qty    07417762292 HC PT THER PROC EA 15 MIN 9/6/2018 Nilda Pugh PTA GP 1          PT G-Codes  Outcome Measure Options: AM-PAC 6 Clicks Basic Mobility (PT)  AM-PAC 6 Clicks Score: 18    Nilda Pugh PTA  9/6/2018

## 2018-09-06 NOTE — PLAN OF CARE
Problem: Patient Care Overview  Goal: Plan of Care Review  Outcome: Ongoing (interventions implemented as appropriate)   09/06/18 0624   Coping/Psychosocial   Plan of Care Reviewed With patient   Plan of Care Review   Progress improving   OTHER   Outcome Summary Medicated as ordered & tolerated well, vss, no distress noticed, I will continue to monitor.     Goal: Individualization and Mutuality  Outcome: Ongoing (interventions implemented as appropriate)    Goal: Discharge Needs Assessment  Outcome: Ongoing (interventions implemented as appropriate)    Goal: Interprofessional Rounds/Family Conf  Outcome: Ongoing (interventions implemented as appropriate)      Problem: Breathing Pattern Ineffective (Adult)  Goal: Identify Related Risk Factors and Signs and Symptoms  Outcome: Ongoing (interventions implemented as appropriate)    Goal: Anxiety/Fear Reduction  Outcome: Ongoing (interventions implemented as appropriate)      Problem: Fall Risk (Adult)  Goal: Identify Related Risk Factors and Signs and Symptoms  Outcome: Ongoing (interventions implemented as appropriate)    Goal: Absence of Fall  Outcome: Ongoing (interventions implemented as appropriate)      Problem: Nutrition, Imbalanced: Inadequate Oral Intake (Adult)  Goal: Identify Related Risk Factors and Signs and Symptoms  Outcome: Ongoing (interventions implemented as appropriate)    Goal: Improved Oral Intake  Outcome: Ongoing (interventions implemented as appropriate)    Goal: Prevent Further Weight Loss  Outcome: Ongoing (interventions implemented as appropriate)

## 2018-09-06 NOTE — DISCHARGE SUMMARY
[facility]    Date of Discharge:  9/6/2018    PCP: Dennise Chu MD    Discharge Diagnosis:   Active Hospital Problems    Diagnosis Date Noted   • **Acute on chronic respiratory failure with hypercapnia (CMS/Formerly [facility]) [J96.22] 09/03/2018   • Acute bronchitis due to Rhinovirus [J20.6] 09/04/2018   • COPD exacerbation (CMS/Formerly [facility]) [J44.1] 09/03/2018   • Protein-calorie malnutrition (CMS/Formerly [facility]) [E46] 09/03/2018   • Emphysema of lung (CMS/Formerly [facility]) [J43.9] 09/03/2018   • Hyperglycemia [R73.9] 12/01/2016      Resolved Hospital Problems    Diagnosis Date Noted Date Resolved   • Dehydration [E86.0] 09/03/2018 09/04/2018     Procedures Performed       Consults     Date and Time Order Name Status Description    9/3/2018 1454 [facility] (on-call MD unless specified) Completed         Hospital Course  Please see history and physical for details. Patient is a 77 y.o. female initially admitted for generalized weakness and troubles breathing.  At admission it was felt that this patient likely had a viral illness and admitting M.D. was correct as patient was positive for rhinovirus causing acute bronchitis and increased secretions which then led to exacerbation of COPD.  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  She still is a tobacco user and ultimately she conveys to me that she normally does not have COPD exacerbations which is amazing and she came to us on no inhaled medications as well.  When I assumed care of the patient the following day of admission she was responding to IV steroids and DuoNeb nebs.  I contemplated adding additional agent such as Symbicort or using Pulmicort/Brovana combo but given her clinical improvement I stayed on the same clinical treatment regimen.  Patient continued to show improvement and I have switched her to oral steroids.  We will get her a nebulizer prior to discharge and  continue with albuterol nebulizer scheduled 4 times daily as well as every 4 when necessary for shortness of breath.  Quite doubtful that this patient will refrain from further tobacco use post discharge.  She did have hyperglycemia but no aspects of diabetes mellitus though her A1c is 6.2.  We've added some senna S for her constipation and ultimately I feel she is medically stable for discharge.   has organized home health which will be ordered for PT and apparently the daughter is going to help with administration of nebulized medications.  Patient will need follow-up with PCP closely in the next week or 2 and will defer the need for further pulmonary medications to be added to her regimen depending on how she looks at follow-up.  I have written for her steroids to be tapered over the course of the week and hopefully patient continues to show clinical improvement.  All questions answered to patient prior to disposition and no family present at the time of discharge so I discussed the case with RN and CCP to ensure a smooth transition to home.        Condition on Discharge: Improved.     Temp:  [97.8 °F (36.6 °C)-98.7 °F (37.1 °C)] 98.5 °F (36.9 °C)  Heart Rate:  [] 77  Resp:  [16-20] 16  BP: (107-129)/(65-75) 129/66  Body mass index is 18.3 kg/m².    Physical Exam   Constitutional: She is oriented to person, place, and time.   Cachexia secondary from long-time tobacco use   Neck: No JVD present.   Cardiovascular: Normal rate and regular rhythm.    Pulmonary/Chest: Effort normal. No respiratory distress.   Much improved air entry to bilateral bases with occasional wheeze   Abdominal: Soft. Bowel sounds are normal. She exhibits no distension. There is no tenderness.   Musculoskeletal: She exhibits no edema.   Neurological: She is alert and oriented to person, place, and time.        Discharge Medications      New Medications      Instructions Start Date   albuterol (2.5 MG/3ML) 0.083% nebulizer  solution  Commonly known as:  PROVENTIL   2.5 mg, Nebulization, Every 4 Hours PRN      famotidine 20 MG tablet  Commonly known as:  PEPCID   20 mg, Oral, Daily      guaiFENesin 600 MG 12 hr tablet  Commonly known as:  MUCINEX   600 mg, Oral, Every 12 Hours      melatonin 5 MG tablet tablet   10 mg, Oral, Nightly      predniSONE 20 MG tablet  Commonly known as:  DELTASONE   40 mg, Oral, Daily With Breakfast      sennosides-docusate sodium 8.6-50 MG tablet  Commonly known as:  SENOKOT-S   2 tablets, Oral, 2 Times Daily         Continue These Medications      Instructions Start Date   MULTIVITAMIN ADULT PO   1 tablet, Oral, Daily      traMADol 50 MG tablet  Commonly known as:  ULTRAM   50 mg, Oral, Every 6 Hours PRN              Additional Instructions for the Follow-ups that You Need to Schedule     Ambulatory Referral to Home Health    As directed      Face to Face Visit Date:  9/6/2018    Follow-up Provider for Plan of Care?:  I treated the patient in an acute care facility and will not continue treatment after discharge.    Follow-up Provider:  YAZMIN GUTIÉRREZ [[id number]]    Reason/Clinical Findings:  copd    Describe mobility limitations that make leaving home difficult:  taxing effort to leave home    Nursing/Therapeutic Services Requested:  Physical Therapy    PT orders:  Therapeutic exercise Gait Training Transfer training Home safety assessment    Weight Bearing Status:  As Tolerated    Frequency:  1 Week 1           Follow-up Information     Yazmin Gutiérrez MD .    Specialty:  Family Medicine  Contact information:  [address] [facility] [id number] [phone]                 Test Results Pending at Discharge     Ryland Lemons MD  09/06/18  12:10 PM    Discharge time spent greater than 30 minutes.

## 2018-09-06 NOTE — DISCHARGE INSTR - APPOINTMENTS
Please call PCP to set up appt in 1 week.     Dennise Chu MD .    Specialty:  Family Medicine  Contact information:  [address] [id number] [phone]

## 2018-09-07 ENCOUNTER — READMISSION MANAGEMENT (OUTPATIENT)
Dept: CALL CENTER | Facility: HOSPITAL | Age: 78
End: 2018-09-07

## 2018-09-07 NOTE — PROGRESS NOTES
Case Management Discharge Note    Final Note: Home with [facility]  and a neb machine from [facility]    Destination     No service has been selected for the patient.      Durable Medical Equipment     No service has been selected for the patient.      Dialysis/Infusion     No service has been selected for the patient.      Home Medical Care - Selection Complete     Service Request Status Selected Specialties Address Phone Number Fax Number    [facility] [address], [address] [address] [phone]      Social Care     No service has been selected for the patient.        Other: Other (Per private vehicle)    Final Discharge Disposition Code: 06 - home with home health care

## 2018-09-07 NOTE — OUTREACH NOTE
Prep Survey      Responses   Facility patient discharged from?  [facility]   Is patient eligible?  Yes   Discharge diagnosis  a/c Resp Failure   Does the patient have one of the following disease processes/diagnoses(primary or secondary)?  Other   Does the patient have Home health ordered?  Yes   What is the Home health agency?   [facility]    Is there a DME ordered?  Yes   What DME was ordered?  Nebulizer from [facility]   Prep survey completed?  Yes          Tricia Bauman RN

## 2018-09-11 ENCOUNTER — READMISSION MANAGEMENT (OUTPATIENT)
Dept: CALL CENTER | Facility: HOSPITAL | Age: 78
End: 2018-09-11

## 2018-09-11 NOTE — OUTREACH NOTE
Medical Week 1 Survey      Responses   Facility patient discharged from?  [facility]   Does the patient have one of the following disease processes/diagnoses(primary or secondary)?  Other   Is there a successful TCM telephone encounter documented?  No   Week 1 attempt successful?  Yes   Call start time  1155   Revoke  Decline to participate   Call end time  1156   Discharge diagnosis  a/c Resp Failure          Antoni Emmanuel RN

## 2021-07-01 NOTE — PROGRESS NOTES
Malnutrition Severity Assessment    Patient Name:  Latisha Amaya  YOB: 1940  MRN: [id number]  Admit Date:  9/3/2018    Patient meets criteria for : Severe malnutrition    Comments:  BMI 15.83, 80% IBW, fairly poor PO intake at home reported.    Severe muscle wasting and fat loss noted upon physical exam.  Detailed in chart below.    Malnutrition Type: Chronic Illness Malnutrition     Malnutrition Type (last 8 hours)      Malnutrition Severity Assessment     Row Name 09/04/18 1604       Malnutrition Severity Assessment    Malnutrition Type Chronic Illness Malnutrition    Row Name 09/04/18 1604       Physical Signs of Malnutrition (Chronic)    Muscle Wasting Severe   severe clavicle region, temporal region, interosseous region, patellar region; moderate acromion region    Fat Loss Severe   severe orbital region, thoracic region, triceps region    Row Name 09/04/18 1604       Weight Status (Chronic)    BMI Severe (<16)    %IBW Mod <80%    Row Name 09/04/18 1604       Energy Intake Status (Chronic)    Energy Intake Mod (<75% / > or equal to 1 mo)    Row Name 09/04/18 1604       Criteria Met (Must meet criteria for severity in at least 2 of these categories: M Wasting, Fat Loss, Fluid, Secondary Signs, Wt. Status, Intake)    Patient meets criteria for  Severe malnutrition          Electronically signed by:  Sofi Valdes RD  09/04/18 4:11 PM       Resulted